# Patient Record
Sex: MALE | Race: WHITE | NOT HISPANIC OR LATINO | Employment: PART TIME | URBAN - METROPOLITAN AREA
[De-identification: names, ages, dates, MRNs, and addresses within clinical notes are randomized per-mention and may not be internally consistent; named-entity substitution may affect disease eponyms.]

---

## 2017-05-24 ENCOUNTER — LAB CONVERSION - ENCOUNTER (OUTPATIENT)
Dept: OTHER | Facility: OTHER | Age: 62
End: 2017-05-24

## 2017-05-24 LAB
A/G RATIO (HISTORICAL): 2 (CALC) (ref 1–2.5)
ALBUMIN SERPL BCP-MCNC: 4.5 G/DL (ref 3.6–5.1)
ALP SERPL-CCNC: 48 U/L (ref 40–115)
ALT SERPL W P-5'-P-CCNC: 25 U/L (ref 9–46)
AST SERPL W P-5'-P-CCNC: 20 U/L (ref 10–35)
BILIRUB SERPL-MCNC: 0.6 MG/DL (ref 0.2–1.2)
BUN SERPL-MCNC: 21 MG/DL (ref 7–25)
BUN/CREA RATIO (HISTORICAL): ABNORMAL (CALC) (ref 6–22)
CALCIUM SERPL-MCNC: 9.5 MG/DL (ref 8.6–10.3)
CHLORIDE SERPL-SCNC: 105 MMOL/L (ref 98–110)
CHOLEST SERPL-MCNC: 163 MG/DL (ref 125–200)
CHOLEST/HDLC SERPL: 3.9 (CALC)
CO2 SERPL-SCNC: 27 MMOL/L (ref 20–31)
CREAT SERPL-MCNC: 0.98 MG/DL (ref 0.7–1.25)
EGFR AFRICAN AMERICAN (HISTORICAL): 96 ML/MIN/1.73M2
EGFR-AMERICAN CALC (HISTORICAL): 83 ML/MIN/1.73M2
GAMMA GLOBULIN (HISTORICAL): 2.3 G/DL (CALC) (ref 1.9–3.7)
GLUCOSE (HISTORICAL): 109 MG/DL (ref 65–99)
HBA1C MFR BLD HPLC: 5.9 % OF TOTAL HGB
HDLC SERPL-MCNC: 42 MG/DL
LDL CHOLESTEROL DIRECT (HISTORICAL): 105 MG/DL
NON-HDL-CHOL (CHOL-HDL) (HISTORICAL): 121 MG/DL (CALC)
POTASSIUM SERPL-SCNC: 4.4 MMOL/L (ref 3.5–5.3)
SODIUM SERPL-SCNC: 141 MMOL/L (ref 135–146)
TOTAL PROTEIN (HISTORICAL): 6.8 G/DL (ref 6.1–8.1)
TRIGL SERPL-MCNC: 113 MG/DL

## 2017-05-30 ENCOUNTER — ALLSCRIPTS OFFICE VISIT (OUTPATIENT)
Dept: OTHER | Facility: OTHER | Age: 62
End: 2017-05-30

## 2017-11-15 ENCOUNTER — LAB CONVERSION - ENCOUNTER (OUTPATIENT)
Dept: OTHER | Facility: OTHER | Age: 62
End: 2017-11-15

## 2017-11-15 LAB
A/G RATIO (HISTORICAL): 1.8 (CALC) (ref 1–2.5)
ALBUMIN SERPL BCP-MCNC: 4.4 G/DL (ref 3.6–5.1)
ALP SERPL-CCNC: 45 U/L (ref 40–115)
ALT SERPL W P-5'-P-CCNC: 28 U/L (ref 9–46)
AST SERPL W P-5'-P-CCNC: 26 U/L (ref 10–35)
BILIRUB SERPL-MCNC: 0.5 MG/DL (ref 0.2–1.2)
BUN SERPL-MCNC: 19 MG/DL (ref 7–25)
BUN/CREA RATIO (HISTORICAL): ABNORMAL (CALC) (ref 6–22)
CALCIUM (ADJUSTED FOR ALBUMIN) (HISTORICAL): 9.7 MG/DL (CALC) (ref 8.6–10.2)
CALCIUM SERPL-MCNC: 9.7 MG/DL (ref 8.6–10.3)
CHLORIDE SERPL-SCNC: 105 MMOL/L (ref 98–110)
CHOLEST SERPL-MCNC: 152 MG/DL
CHOLEST/HDLC SERPL: 3.9 (CALC)
CO2 SERPL-SCNC: 29 MMOL/L (ref 20–31)
CREAT SERPL-MCNC: 1.12 MG/DL (ref 0.7–1.25)
DEPRECATED RDW RBC AUTO: 12.4 % (ref 11–15)
EGFR AFRICAN AMERICAN (HISTORICAL): 81 ML/MIN/1.73M2
EGFR-AMERICAN CALC (HISTORICAL): 70 ML/MIN/1.73M2
EST. AVERAGE GLUCOSE BLD GHB EST-MCNC: 123 (CALC)
EST. AVERAGE GLUCOSE BLD GHB EST-MCNC: 6.8 (CALC)
GAMMA GLOBULIN (HISTORICAL): 2.4 G/DL (CALC) (ref 1.9–3.7)
GLUCOSE (HISTORICAL): 131 MG/DL (ref 65–99)
HBA1C MFR BLD HPLC: 5.9 % OF TOTAL HGB
HCT VFR BLD AUTO: 47 % (ref 38.5–50)
HDLC SERPL-MCNC: 39 MG/DL
HGB BLD-MCNC: 15.6 G/DL (ref 13.2–17.1)
LDL CHOLESTEROL (HISTORICAL): 95 MG/DL (CALC)
MCH RBC QN AUTO: 30.6 PG (ref 27–33)
MCHC RBC AUTO-ENTMCNC: 33.2 G/DL (ref 32–36)
MCV RBC AUTO: 92.2 FL (ref 80–100)
NON-HDL-CHOL (CHOL-HDL) (HISTORICAL): 113 MG/DL (CALC)
PLATELET # BLD AUTO: 152 THOUSAND/UL (ref 140–400)
PMV BLD AUTO: 9.7 FL (ref 7.5–12.5)
POTASSIUM SERPL-SCNC: 4.7 MMOL/L (ref 3.5–5.3)
PROSTATE SPECIFIC ANTIGEN TOTAL (HISTORICAL): 0.5 NG/ML
RBC # BLD AUTO: 5.1 MILLION/UL (ref 4.2–5.8)
SODIUM SERPL-SCNC: 138 MMOL/L (ref 135–146)
TOTAL PROTEIN (HISTORICAL): 6.8 G/DL (ref 6.1–8.1)
TRIGL SERPL-MCNC: 85 MG/DL
TSH SERPL DL<=0.05 MIU/L-ACNC: 1.52 MIU/L (ref 0.4–4.5)
WBC # BLD AUTO: 4.5 THOUSAND/UL (ref 3.8–10.8)

## 2017-11-24 ENCOUNTER — ALLSCRIPTS OFFICE VISIT (OUTPATIENT)
Dept: OTHER | Facility: OTHER | Age: 62
End: 2017-11-24

## 2017-11-24 DIAGNOSIS — R91.8 OTHER NONSPECIFIC ABNORMAL FINDING OF LUNG FIELD (CODE): ICD-10-CM

## 2017-11-25 NOTE — PROGRESS NOTES
Assessment    1  Pulmonary nodules (793 19) (R91 8)   · CT 6/2/16, for a 4 mm lingular nodule in 3 mm left lower lobe nodule, repeat CT one year,   2  Hypertension (401 9) (I10)   3  Hyperlipidemia (272 4) (E78 5)   4  Hyperglycemia (790 29) (R73 9)    Plan  BPH with obstruction/lower urinary tract symptoms, Erectile dysfunction of non-organicorigin    · Cialis 10 MG Oral Tablet; TAKE 1 TABLET DAILY 1 HOUR BEFORE NEEDED  Health Maintenance, Hyperglycemia, Hyperlipidemia, Hypertension    · (1) LDL,DIRECT; Status:Active; Requested for:01May2018;    · (Q) COMPREHENSIVE METABOLIC PNL W/ADJUSTED CALCIUM; Status:Active; Requested for:01May2018;    · (Q) HEMOGLOBIN A1c WITH eAG; Status:Active; Requested for:01May2018; Discussion/Summary    #1  Hypertension-stable at 122/60  He is on amlodipine 10 milligrams daily and losartan 25 milligrams daily  Hyperlipidemia-cholesterol numbers are at goal with the LDL 95  He takes atorvastatin 10 milligrams daily  Hyperglycemia-patient's glucose went up from 109 to 131 but his hemoglobin A1c remained the same at 5 9 percent  Pulmonary nodules-low-dose CT scan of lungs will be obtained  to keep his blood sugar down and his cholesterol numbers down  6 months with labs  Call patient with results of CT scan for pulmonary nodule follow-up  Possible side effects of new medications were reviewed with the patient/guardian today  The treatment plan was reviewed with the patient/guardian  The patient/guardian understands and agrees with the treatment plan     Self Referrals: No      Chief Complaint  6 month follow up for chronic conditions and to review blood work  Jayce Brewster      History of Present Illness  This is a 57-year-old male who comes in for his regular 6 month visit  He is feeling well with no complaints or problems  His blood pressure today is 122/60 but he has gained 6 pound since the previous visit with his weight being 193 pounds   Reviewing his labs his LDL came down from 105 to 95, glucose went up from 109 to 131 but his hemoglobin A1c remained the same at 5 9 percent  His CBC, TSH and PSA are all within normal range  Review of Systems   Constitutional: No fever or chills, feels well, no tiredness, no recent weight gain or weight loss  ENT: no complaints of earache, no hearing loss, no nosebleeds, no nasal discharge, no sore throat, no hoarseness  Cardiovascular: No complaints of slow heart rate, no fast heart rate, no chest pain, no palpitations, no leg claudication, no lower extremity  Respiratory: No complaints of shortness of breath, no wheezing, no cough, no SOB on exertion, no orthopnea or PND  Gastrointestinal: No complaints of abdominal pain, no constipation, no nausea or vomiting, no diarrhea or bloody stools  Genitourinary: No complaints of dysuria, no incontinence, no hesitancy, no nocturia, no genital lesion, no testicular pain  ROS reviewed  Active Problems  1  BPH with obstruction/lower urinary tract symptoms (600 01,599 69) (N40 1,N13 8)   2  Cervicalgia (723 1) (M54 2)   3  Colonoscopy (Fiberoptic) Screening   4  Contact dermatitis (692 9) (L25 9)   5  Encounter for prostate cancer screening (V76 44) (Z12 5)   6  Erectile dysfunction of non-organic origin (302 72) (F52 21)   7  Headache (784 0) (R51)   8  Hyperglycemia (790 29) (R73 9)   9  Hyperlipidemia (272 4) (E78 5)   10  Hypertension (401 9) (I10)   11  Migraine headache (346 90) (G43 909)   12  Need for immunization against influenza (V04 81) (Z23)   13  Nocturia (788 43) (R35 1)   14  Pulmonary nodules (793 19) (R91 8)   15  Screening for colon cancer (V76 51) (Z12 11)   16  Tinnitus (388 30) (H93 19)    Past Medical History  1  History of Alcohol Abuse - In Remission (305 03)   2  History of Drug dependence, in remission (304 93) (F19 21)   3  History of Head Injury (959 01)    The active problems and past medical history were reviewed and updated today  Surgical History  1   History of Complete Colonoscopy   2  History of Elbow Surgery   3  History of Nasal Septal Deviation Repair   4  History of Tonsillectomy    The surgical history was reviewed and updated today  Family History  Mother    1  Family history of Headache   2  Family history of Heart Disease (V17 49)   3  Family history of Hypertension (V17 49)   4  Family history of Pure Hypercholesterolemia  Daughter    5  Family history of Bipolar Disorder NOS   6  Family history of Headache   7  Family history of Irritable Bowel Syndrome    The family history was reviewed and updated today  Social History     · Caffeine Use   ·    · Employed   · Former smoker (T73 73) (T47 805)   · Marital History - Single   · No secondhand smoke exposure (V49 89) (Z78 9)   · Occupation:   · Recovering Alcoholic   · Recovering From Drug Addiction  The social history was reviewed and updated today  The social history was reviewed and is unchanged  Current Meds   1  Allegra Allergy 180 MG Oral Tablet Recorded   2  AmLODIPine Besylate 10 MG Oral Tablet; Take 1 tablet daily; Therapy: 93QNL6236 to (Evaluate:79Oyd2814)  Requested for: 39BFU4597; Last Rx:01Mar2017 Ordered   3  Atorvastatin Calcium 10 MG Oral Tablet; Take 1 tablet daily; Therapy: 04RYI0401 to (Lance Fang)  Requested for: 08JZF6820; Last Rx:14Nov2017 Ordered   4  Cialis 10 MG Oral Tablet; TAKE 1 TABLET DAILY 1 HOUR BEFORE NEEDED; Therapy: 14ECB3162 to (Evaluate:26Nov2017)  Requested for: 49TTE6159; Last Rx:66Bop4592 Ordered   5  Ibuprofen 200 MG Oral Tablet Recorded   6  Losartan Potassium 25 MG Oral Tablet; Take 1 tablet daily; Therapy: 14FUU0405 to (Evaluate:72Qca7294)  Requested for: 17YRQ4113; Last Rx:01Mar2017 Ordered   7  Mometasone Furoate 0 1 % External Cream; APPLY SPARINGLY TO AFFECTED AREAS TWICE DAILY  (AM AND PM); Therapy: 07UIT8698 to (Last KL:98QEP5271)  Requested for: 97TVW2268 Ordered   8   SUMAtriptan Succinate 100 MG Oral Tablet; TAKE AS DIRECTED  Requested for: 94MEM2353; Last Rx:17Mar2016 Ordered   9  Tamsulosin HCl - 0 4 MG Oral Capsule; take 1 capsule daily; Therapy: 71DSM4016 to (Evaluate:27Aoo2844)  Requested for: 42QNL3229; Last Rx:14Nov2017 Ordered   10  Tamsulosin HCl - 0 4 MG Oral Capsule; TAKE 1 CAPSULE EVERY DAY; Therapy: 79BUX7887 to ()  Requested for: 63Dwy2366; Last  Rx:52Rpl5074 Ordered    The medication list was reviewed and updated today  Allergies  1  No Known Drug Allergies    Vitals  Vital Signs    Recorded: 51ZLU1143 11:39AM   Heart Rate 72   Systolic 484, LUE, Sitting   Diastolic 60, LUE, Sitting   Height 5 ft 10 in   Weight 193 lb 8 oz   BMI Calculated 27 76   BSA Calculated 2 06       Physical Exam   Constitutional  General appearance: No acute distress, well appearing and well nourished  Ears, Nose, Mouth, and Throat  External inspection of ears and nose: Normal    Otoscopic examination: Tympanic membrance translucent with normal light reflex  Canals patent without erythema  Oropharynx: Normal with no erythema, edema, exudate or lesions  Pulmonary  Auscultation of lungs: Clear to auscultation, equal breath sounds bilaterally, no wheezes, no rales, no rhonci  Cardiovascular  Auscultation of heart: Normal rate and rhythm, normal S1 and S2, without murmurs  Examination of extremities for edema and/or varicosities: Normal    Psychiatric  Orientation to person, place and time: Normal    Mood and affect: Normal          Results/Data  PHQ-2 Adult Depression Screening 24Nov2017 11:53AM User, Ahs     Test Name Result Flag Reference   PHQ-2 Adult Depression Score 0       Over the last two weeks, how often have you been bothered by any of the following problems?  Little interest or pleasure in doing things: Not at all - 0 Feeling down, depressed, or hopeless: Not at all - 0   PHQ-2 Adult Depression Screening Negative         Future Appointments    Date/Time Provider Specialty Site   05/21/2018 04:30 PM Elizabeth Mason [facility]       Signatures   Electronically signed by : Fadi Putnam, [facility]; Nov 24 2017 12:24PM EST                       (Author)    Electronically signed by : Ale Botello DO; Nov 24 2017 12:26PM EST                       (Author)

## 2018-01-11 NOTE — RESULT NOTES
Verified Results  (1) COMPREHENSIVE METABOLIC PANEL [id number] 95:15CW Jazmyn Mcdaniel     Test Name Result Flag Reference   GLUCOSE 137 mg/dL H 65-99   Fasting reference interval   UREA NITROGEN (BUN) 21 mg/dL  7-25   CREATININE 1 14 mg/dL  0 70-1 25   For patients >52years of age, the reference limit  for Creatinine is approximately 13% higher for people  identified as -American  eGFR NON-AFR  AMERICAN 69 mL/min/1 73m2  > OR = 60   eGFR AFRICAN AMERICAN 80 mL/min/1 73m2  > OR = 60   BUN/CREATININE RATIO   0-31   NOT APPLICABLE (calc)   SODIUM 137 mmol/L  135-146   POTASSIUM 4 4 mmol/L  3 5-5 3   CHLORIDE 101 mmol/L     CARBON DIOXIDE 29 mmol/L  20-31   CALCIUM 9 7 mg/dL  8 6-10 3   PROTEIN, TOTAL 7 2 g/dL  6 1-8 1   ALBUMIN 4 5 g/dL  3 6-5 1   GLOBULIN 2 7 g/dL (calc)  1 9-3 7   ALBUMIN/GLOBULIN RATIO 1 7 (calc)  1 0-2 5   BILIRUBIN, TOTAL 0 6 mg/dL  0 2-1 2   ALKALINE PHOSPHATASE 55 U/L     AST 24 U/L  10-35   ALT 30 U/L  9-46     (1) INSULIN 62FOL4676 08:55AM Pradeep Mcdaniel     Test Name Result Flag Reference   INSULIN 11 1 uIU/mL  2 0-19 6   This insulin assay shows strong cross-reactivity for  some insulin analogs (lispro, aspart, and glargine)  and much lower cross-reactivity with others (detemir,  glulisine)  (1) LIPID PANEL, FASTING [id number] 08:55AM Pradeep Mcdaniel     Test Name Result Flag Reference   CHOLESTEROL, TOTAL 207 mg/dL H 125-200   HDL CHOLESTEROL 38 mg/dL L > OR = 40   TRIGLICERIDES 743 mg/dL H <150   LDL-CHOLESTEROL 131 mg/dL (calc) H <130   Desirable range <100 mg/dL for patients with CHD or  diabetes and <70 mg/dL for diabetic patients with  known heart disease  CHOL/HDLC RATIO 5 4 (calc) H < OR = 5 0   NON HDL CHOLESTEROL 169 mg/dL (calc) H    Target for non-HDL cholesterol is 30 mg/dL higher than   LDL cholesterol target       (Q) CBC (H/H, RBC, INDICES, WBC, PLT) [id number] 08:55AM Pradeep Mcdaniel     Test Name Result Flag Reference   WHITE BLOOD CELL COUNT 6 2 Thousand/uL  3 8-10 8   RED BLOOD CELL COUNT 5 34 Million/uL  4 20-5 80   HEMOGLOBIN 16 1 g/dL  13 2-17 1   HEMATOCRIT 48 7 %  38 5-50 0   MCV 91 0 fL  80 0-100 0   MCH 30 1 pg  27 0-33 0   MCHC 33 1 g/dL  32 0-36 0   RDW 13 1 %  11 0-15 0   PLATELET COUNT 752 Thousand/uL  140-400   MPV 6 9 fL L 7 5-11 5     (Q) TSH, 3RD GENERATION 97GAQ7897 08:55AM Pradeep Mcdaniel     Test Name Result Flag Reference   TSH 1 50 mIU/L  0 40-4 50     (1) PSA (SCREEN) (Dx V76 44 Screen for Prostate Cancer) [id number] 08:55AM Gillian Mcdaniel     Test Name Result Flag Reference   PSA, TOTAL 0 4 ng/mL  < OR = 4 0   This test was performed using the Siemens  chemiluminescent method  Values obtained from  different assay methods cannot be used  interchangeably  PSA levels, regardless of  value, should not be interpreted as absolute  evidence of the presence or absence of disease  (Q) HEMOGLOBIN A1c 28Nov2016 08:55AM Pradeep Mcdaniel     Test Name Result Flag Reference   HEMOGLOBIN A1c 6 3 % of total Hgb H <5 7   According to ADA guidelines, hemoglobin A1c <7 0%  represents optimal control in non-pregnant diabetic  patients  Different metrics may apply to specific  patient populations  Standards of Medical Care in  [phone]  Diabetes Care  2013;36:s11-s66     For the purpose of screening for the presence of  diabetes  <5 7%       Consistent with the absence of diabetes  5 7-6 4%    Consistent with increased risk for diabetes              (prediabetes)  >or=6 5%    Consistent with diabetes     This assay result is consistent with a higher risk  of diabetes  Currently, no consensus exists for use of hemoglobin  A1c for diagnosis of diabetes for children       (Q) URINALYSIS REFLEX [id number] 08:55AM Pradeep Mcdaniel   REPORT COMMENT:  FASTING:YES     Test Name Result Flag Reference   COLOR YELLOW  YELLOW   APPEARANCE CLOUDY A CLEAR   SPECIFIC GRAVITY 1 015  1 001-1 035   PH 7 5  5 0-8 0   GLUCOSE NEGATIVE  NEGATIVE   BILIRUBIN NEGATIVE  NEGATIVE   KETONES NEGATIVE  NEGATIVE   OCCULT BLOOD NEGATIVE  NEGATIVE   PROTEIN NEGATIVE  NEGATIVE   NITRITE NEGATIVE  NEGATIVE   LEUKOCYTE ESTERASE NEGATIVE  NEGATIVE

## 2018-01-11 NOTE — RESULT NOTES
Verified Results  US KIDNEY AND BLADDER [id number] 07:03PM Dairl Left Order Number: [id number]     Test Name Result Flag Reference   US KIDNEY AND BLADDER (Report)     RENAL ULTRASOUND     INDICATION: Enlarged prostate, dysuria and lower abdominal pain  COMPARISON: None     TECHNIQUE:  Ultrasound of the retroperitoneum was performed with a curvilinear transducer utilizing volumetric sweeps and still imaging techniques  FINDINGS:     KIDNEYS:   Symmetric and normal size  Right kidney: 11 3 x 6 5 cm  Normal echogenicity and contour  No suspicious masses detected  No hydronephrosis  No shadowing calculi  No perinephric fluid collections  Left kidney: 11 2 x 5 4 cm  Normal echogenicity and contour  No suspicious masses detected  No hydronephrosis  Mild lower pole caliectasis versus parapelvic cyst    No shadowing calculi  No perinephric fluid collections  URETERS:   Nonvisualized  BLADDER:    Poorly distended  Bladder volume measures 153 mL  No focal thickening or mass lesions  Bilateral ureteral jets detected  Enlarged prostate measuring 5 0 x 2 7 x 4 3 cm with hypertrophy of the median lobe protruding into the base of the bladder  IMPRESSION:     No hydronephrosis  Mild left lower pole caliectasis versus parapelvic cyst       Prostatomegaly  Workstation performed: [id number]     Signed by:   Clovis Peters DO   5/26/16       Plan  Mass of left kidney    · CT ABDOMEN W WO CONTRAST; Status:Need Information - Financial Authorization;   Requested for:93Iud3249;   Nocturia, Pelvic pain, Prostatic enlargement    · *1 - [facility] Physician Referral  Consult  Status: Active  Requested  for: 11TFB1454  Care Summary provided  : Yes

## 2018-01-12 VITALS
BODY MASS INDEX: 27.7 KG/M2 | SYSTOLIC BLOOD PRESSURE: 122 MMHG | HEIGHT: 70 IN | WEIGHT: 193.5 LBS | HEART RATE: 72 BPM | DIASTOLIC BLOOD PRESSURE: 60 MMHG

## 2018-01-14 VITALS
HEART RATE: 82 BPM | HEIGHT: 70 IN | BODY MASS INDEX: 26.77 KG/M2 | DIASTOLIC BLOOD PRESSURE: 72 MMHG | SYSTOLIC BLOOD PRESSURE: 110 MMHG | WEIGHT: 187 LBS

## 2018-01-14 NOTE — RESULT NOTES
Verified Results  (Q) CHLAMYDIA/N  GONORRHOEAE DNA, SDA [id number] 12:00AM Pradeep Mcdaniel     Test Name Result Flag Reference   CHLAMYDIA TRACHOMATIS$RNA, TMA NOT DETECTED  NOT DETECTED   NEISSERIA [address], TMA NOT DETECTED  NOT DETECTED   This test was performed using the [address] [place] [address]  ([place] [address] )  The analytical performance characteristics of this   assay, when used to test SurePath specimens have  been determined by [employer]             (Q) CULTURE, URINE, SPECIAL [id number] 12:00AM Pradeep Mcdaniel     Test Name Result Flag Reference   CULTURE, URINE, SPECIAL      CULTURE, URINE, SPECIAL         MICRO NUMBER:      [id number]    TEST STATUS:       FINAL    SPECIMEN SOURCE:   URINE    SPECIMEN QUALITY:  ADEQUATE    RESULT:            No Growth

## 2018-01-15 NOTE — MISCELLANEOUS
Message  c-t scheduled for june 2,2016 at [facility] for 7:15 arrive at 7:00 4 hour fast  told to make appointment at urology information mailed to patient  Active Problems    1  Cervicalgia (723 1) (M54 2)   2  Colonoscopy (Fiberoptic) Screening   3  Erectile dysfunction of non-organic origin (302 72) (F52 21)   4  Headache (784 0) (R51)   5  Hyperglycemia (790 29) (R73 9)   6  Hyperlipidemia (272 4) (E78 5)   7  Hypertension (401 9) (I10)   8  Mass of left kidney (593 9) (N28 89)   9  Migraine headache (346 90) (G43 909)   10  Need for immunization against influenza (V04 81) (Z23)   11  Nocturia (788 43) (R35 1)   12  Pelvic pain (R10 2)   13  Prostatic enlargement (600 00) (N40 0)   14  Urethritis (597 80) (N34 2)   15  Urgency of urination (788 63) (R39 15)   16  Urinary frequency (788 41) (R35 0)   17  Weak urinary stream (788 62) (R39 12)    Current Meds   1  AmLODIPine Besylate 10 MG Oral Tablet; TAKE 1 TABLET DAILY  Requested for:   19KDA4602; Last Rx:17Mar2016 Ordered   2  Cialis 10 MG Oral Tablet; Take as directed; Therapy: 28ETF1998 to (Evaluate:16Avv1965); Last Rx:67Rvj4993 Ordered   3  Ciprofloxacin HCl - 500 MG Oral Tablet; Take 1 tablet twice daily; Therapy: 09TOD1199 to (Last Rx:26May2016)  Requested for: 36XKD6451 Ordered   4  Cyclobenzaprine HCl - 5 MG Oral Tablet; TAKE 1 -2 AT BEDTIME AS NEEDED FOR   NECK PAIN;   Therapy: 85WYE5684 to (Last Rx:08Kgo8816)  Requested for: 36BER3892 Ordered   5  Divalproex Sodium  MG Oral Tablet Extended Release 24 Hour; One at bedtime   for 5 days then one tab everother day for 6 days then stop; Therapy: 48CGD4436 to (Last Rx:11May2016)  Requested for: 66QDW0809 Ordered   6  Losartan Potassium 25 MG Oral Tablet; Take 1 tablet daily  Requested for: 97GKJ4489;   Last Rx:17Mar2016 Ordered   7  Pravastatin Sodium 20 MG Oral Tablet; TAKE 1 TABLET DAILY  Requested for:   97IOS6297; Last Rx:17Mar2016 Ordered   8   SUMAtriptan Succinate 100 MG Oral Tablet; TAKE AS DIRECTED  Requested for:   11ZFE8618; Last Rx:17Mar2016 Ordered   9  Tamsulosin HCl - 0 4 MG Oral Capsule; take 1 capsule daily; Therapy: 19TDC0608 to (Giuliano Moran)  Requested for: 15RQU4017; Last   Rx:62Vsu7265 Ordered    Allergies    1   No Known Drug Allergies    Signatures   Electronically signed by : Arjun Mar DO; May 27 2016  8:53AM EST                       (Author)

## 2018-03-09 ENCOUNTER — TELEPHONE (OUTPATIENT)
Dept: FAMILY MEDICINE CLINIC | Facility: CLINIC | Age: 63
End: 2018-03-09

## 2018-03-09 ENCOUNTER — OFFICE VISIT (OUTPATIENT)
Dept: FAMILY MEDICINE CLINIC | Facility: CLINIC | Age: 63
End: 2018-03-09
Payer: COMMERCIAL

## 2018-03-09 VITALS
HEART RATE: 84 BPM | WEIGHT: 193.6 LBS | DIASTOLIC BLOOD PRESSURE: 70 MMHG | BODY MASS INDEX: 27.78 KG/M2 | SYSTOLIC BLOOD PRESSURE: 130 MMHG

## 2018-03-09 DIAGNOSIS — I10 ESSENTIAL HYPERTENSION: ICD-10-CM

## 2018-03-09 DIAGNOSIS — R06.83 SNORING: Primary | ICD-10-CM

## 2018-03-09 DIAGNOSIS — E78.2 MIXED HYPERLIPIDEMIA: ICD-10-CM

## 2018-03-09 PROCEDURE — 99214 OFFICE O/P EST MOD 30 MIN: CPT | Performed by: PHYSICIAN ASSISTANT

## 2018-03-09 RX ORDER — LOSARTAN POTASSIUM 25 MG/1
25 TABLET ORAL DAILY
Qty: 90 TABLET | Refills: 3 | Status: SHIPPED | OUTPATIENT
Start: 2018-03-09 | End: 2019-01-21 | Stop reason: SDUPTHER

## 2018-03-09 RX ORDER — AMLODIPINE BESYLATE 10 MG/1
1 TABLET ORAL DAILY
COMMUNITY
Start: 2017-03-01 | End: 2018-03-09 | Stop reason: SDUPTHER

## 2018-03-09 RX ORDER — TAMSULOSIN HYDROCHLORIDE 0.4 MG/1
0.4 CAPSULE ORAL DAILY
Qty: 90 CAPSULE | Refills: 3 | Status: SHIPPED | OUTPATIENT
Start: 2018-03-09 | End: 2019-01-21 | Stop reason: SDUPTHER

## 2018-03-09 RX ORDER — LOSARTAN POTASSIUM 25 MG/1
1 TABLET ORAL DAILY
COMMUNITY
Start: 2017-03-01 | End: 2018-03-09 | Stop reason: SDUPTHER

## 2018-03-09 RX ORDER — SUMATRIPTAN 100 MG/1
TABLET, FILM COATED ORAL
COMMUNITY

## 2018-03-09 RX ORDER — ATORVASTATIN CALCIUM 10 MG/1
1 TABLET, FILM COATED ORAL DAILY
COMMUNITY
Start: 2016-11-30 | End: 2018-03-09 | Stop reason: SDUPTHER

## 2018-03-09 RX ORDER — ATORVASTATIN CALCIUM 10 MG/1
10 TABLET, FILM COATED ORAL DAILY
Qty: 90 TABLET | Refills: 3 | Status: SHIPPED | OUTPATIENT
Start: 2018-03-09 | End: 2018-06-27 | Stop reason: SDUPTHER

## 2018-03-09 RX ORDER — TAMSULOSIN HYDROCHLORIDE 0.4 MG/1
1 CAPSULE ORAL DAILY
COMMUNITY
Start: 2016-05-26 | End: 2018-03-09 | Stop reason: SDUPTHER

## 2018-03-09 RX ORDER — AMLODIPINE BESYLATE 10 MG/1
10 TABLET ORAL DAILY
Qty: 90 TABLET | Refills: 3 | Status: SHIPPED | OUTPATIENT
Start: 2018-03-09 | End: 2019-01-21 | Stop reason: SDUPTHER

## 2018-03-09 NOTE — PATIENT INSTRUCTIONS
1   Snoring-patient has had chronic problems with snoring for several years  Question possible sleep apnea  Would recommend home sleep study  Patient will also be set up with ENT specialist for evaluation  He has had previous sinus surgery and we want to rule out any complication from this as well as possibly discuss surgical treatments for sleep apnea if necessary  2   Hypertension-stable on present regimen, no medication changes  3   Hyperlipidemia-stable on statin therapy, no medication changes

## 2018-03-09 NOTE — TELEPHONE ENCOUNTER
SUMIT HARVEY [facility] CALLED AND ADVISED THAT THEY ARE FAXING OVER A PRIOR AUTH FOR PT'S MEDICATIONS  CALLED [facility]EN[facility]AL FAX AND LMOM THAT WE WILL NEED THAT PAPERWORK FORWARDED FOR APPT TODAY

## 2018-03-09 NOTE — PROGRESS NOTES
Assessment/Plan:  Patient Instructions   1  Snoring-patient has had chronic problems with snoring for several years  Question possible sleep apnea  Would recommend home sleep study  Patient will also be set up with ENT specialist for evaluation  He has had previous sinus surgery and we want to rule out any complication from this as well as possibly discuss surgical treatments for sleep apnea if necessary  2   Hypertension-stable on present regimen, no medication changes  3   Hyperlipidemia-stable on statin therapy, no medication changes  No problem-specific Assessment & Plan notes found for this encounter  Diagnoses and all orders for this visit:    Snoring  -     amLODIPine (NORVASC) 10 mg tablet; Take 1 tablet (10 mg total) by mouth daily  -     atorvastatin (LIPITOR) 10 mg tablet; Take 1 tablet (10 mg total) by mouth daily  -     losartan (COZAAR) 25 mg tablet; Take 1 tablet (25 mg total) by mouth daily  -     tamsulosin (FLOMAX) 0 4 mg; Take 1 capsule (0 4 mg total) by mouth daily  -     Home Study; Future  -     Ambulatory Referral to Otolaryngology; Future    Mixed hyperlipidemia  -     amLODIPine (NORVASC) 10 mg tablet; Take 1 tablet (10 mg total) by mouth daily  -     atorvastatin (LIPITOR) 10 mg tablet; Take 1 tablet (10 mg total) by mouth daily  -     losartan (COZAAR) 25 mg tablet; Take 1 tablet (25 mg total) by mouth daily  -     tamsulosin (FLOMAX) 0 4 mg; Take 1 capsule (0 4 mg total) by mouth daily  -     Home Study; Future  -     Ambulatory Referral to Otolaryngology; Future    Essential hypertension  -     amLODIPine (NORVASC) 10 mg tablet; Take 1 tablet (10 mg total) by mouth daily  -     atorvastatin (LIPITOR) 10 mg tablet; Take 1 tablet (10 mg total) by mouth daily  -     losartan (COZAAR) 25 mg tablet; Take 1 tablet (25 mg total) by mouth daily  -     tamsulosin (FLOMAX) 0 4 mg; Take 1 capsule (0 4 mg total) by mouth daily  -     Home Study;  Future  -     Ambulatory Referral to Otolaryngology; Future          Subjective:    CC: Pt  Would like to discuss snoring  shannon     Patient ID: Jeri Hawk is a 58 y o  male  HPI:  This is a 63-year-old gentleman that presents to the office for follow-up of chronic health conditions and concerns over snoring  He states that he has been snoring for several years and in fact about 2 decades ago had sinus surgery for deviated septum but never seemed to improve with his snoring afterward  He wakes up about 4-5 times per night to urinate for his prostate but aside from that he seems to wake well rested and feel well throughout the day  He has had some witnessed apneic episodes at night time however  His snoring will often wake himself from sleep  He also has a history of hypertension and hyperlipidemia  The following portions of the patient's history were reviewed and updated as appropriate: allergies, current medications, past family history, past medical history, past social history, past surgical history and problem list     Review of Systems   Constitutional: Negative for chills, fatigue and fever  HENT: Negative for congestion, ear pain and sinus pressure  Eyes: Negative for visual disturbance  Respiratory: Negative for cough, chest tightness and shortness of breath  Cardiovascular: Negative for chest pain and palpitations  Gastrointestinal: Negative for diarrhea, nausea and vomiting  Endocrine: Negative for polyuria  Genitourinary: Negative for dysuria and frequency  Musculoskeletal: Negative for arthralgias and myalgias  Skin: Negative for pallor and rash  Neurological: Negative for dizziness, weakness, light-headedness, numbness and headaches  Psychiatric/Behavioral: Negative for agitation, behavioral problems and sleep disturbance  All other systems reviewed and are negative          Objective:      Vitals:    03/09/18 1328   BP: 130/70   BP Location: Left arm   Patient Position: Sitting Pulse: 84   Weight: 87 8 kg (193 lb 9 6 oz)            Physical Exam   Constitutional: He is oriented to person, place, and time  He appears well-developed and well-nourished  No distress  HENT:   Head: Normocephalic and atraumatic  Right Ear: External ear normal    Left Ear: External ear normal    Nose: Nose normal    Mouth/Throat: Oropharynx is clear and moist  No oropharyngeal exudate  Eyes: Conjunctivae and EOM are normal  Pupils are equal, round, and reactive to light  Neck: Normal range of motion  Neck supple  No tracheal deviation present  No thyromegaly present  Cardiovascular: Normal rate, regular rhythm and normal heart sounds  Exam reveals no friction rub  No murmur heard  Pulmonary/Chest: Effort normal and breath sounds normal  No respiratory distress  He has no wheezes  He has no rales  Abdominal: Soft  Bowel sounds are normal  He exhibits no distension  There is no tenderness  There is no rebound and no guarding  Musculoskeletal: Normal range of motion  He exhibits no edema or tenderness  Lymphadenopathy:     He has no cervical adenopathy  Neurological: He is alert and oriented to person, place, and time  No cranial nerve deficit  Coordination normal    Skin: Skin is warm and dry  No rash noted  No erythema  Psychiatric: He has a normal mood and affect  His behavior is normal  Thought content normal    Nursing note and vitals reviewed

## 2018-05-19 ENCOUNTER — TELEPHONE (OUTPATIENT)
Dept: FAMILY MEDICINE CLINIC | Facility: CLINIC | Age: 63
End: 2018-05-19

## 2018-05-19 NOTE — TELEPHONE ENCOUNTER
Pt called and cancelled his 6 month f/up appt for 5/21/18  Pt stated he would like scripts for BW so he can get that done and then schedule his appt  Pt requested that BW scripts be mailed to him as he uses [facility] for BW    Thank you

## 2018-05-21 DIAGNOSIS — Z12.11 SCREEN FOR COLON CANCER: ICD-10-CM

## 2018-05-21 DIAGNOSIS — I10 ESSENTIAL HYPERTENSION: Primary | ICD-10-CM

## 2018-05-21 DIAGNOSIS — E78.2 MIXED HYPERLIPIDEMIA: ICD-10-CM

## 2018-05-21 DIAGNOSIS — N40.1 BPH WITH OBSTRUCTION/LOWER URINARY TRACT SYMPTOMS: ICD-10-CM

## 2018-05-21 DIAGNOSIS — R73.9 HYPERGLYCEMIA: ICD-10-CM

## 2018-05-21 DIAGNOSIS — N13.8 BPH WITH OBSTRUCTION/LOWER URINARY TRACT SYMPTOMS: ICD-10-CM

## 2018-05-21 DIAGNOSIS — Z12.5 SCREENING FOR PROSTATE CANCER: ICD-10-CM

## 2018-05-21 NOTE — PROGRESS NOTES
Orders are done and patient is requesting that these orders be mailed to him  They are printed out front

## 2018-06-04 LAB
ALBUMIN SERPL-MCNC: 4.4 G/DL (ref 3.6–5.1)
ALBUMIN/GLOB SERPL: 1.6 (CALC) (ref 1–2.5)
ALP SERPL-CCNC: 44 U/L (ref 40–115)
ALT SERPL-CCNC: 28 U/L (ref 9–46)
AST SERPL-CCNC: 22 U/L (ref 10–35)
BASOPHILS # BLD AUTO: 30 CELLS/UL (ref 0–200)
BASOPHILS NFR BLD AUTO: 0.6 %
BILIRUB SERPL-MCNC: 0.8 MG/DL (ref 0.2–1.2)
BUN SERPL-MCNC: 22 MG/DL (ref 7–25)
BUN/CREAT SERPL: ABNORMAL (CALC) (ref 6–22)
CALCIUM SERPL-MCNC: 9.2 MG/DL (ref 8.6–10.3)
CHLORIDE SERPL-SCNC: 104 MMOL/L (ref 98–110)
CHOLEST SERPL-MCNC: 168 MG/DL
CHOLEST/HDLC SERPL: 4.5 (CALC)
CO2 SERPL-SCNC: 25 MMOL/L (ref 20–31)
CREAT SERPL-MCNC: 1.18 MG/DL (ref 0.7–1.25)
EOSINOPHIL # BLD AUTO: 80 CELLS/UL (ref 15–500)
EOSINOPHIL NFR BLD AUTO: 1.6 %
ERYTHROCYTE [DISTWIDTH] IN BLOOD BY AUTOMATED COUNT: 12.4 % (ref 11–15)
EST. AVERAGE GLUCOSE BLD GHB EST-MCNC: 120 (CALC)
EST. AVERAGE GLUCOSE BLD GHB EST-SCNC: 6.6 (CALC)
GLOBULIN SER CALC-MCNC: 2.7 G/DL (CALC) (ref 1.9–3.7)
GLUCOSE SERPL-MCNC: 121 MG/DL (ref 65–99)
HBA1C MFR BLD: 5.8 % OF TOTAL HGB
HCT VFR BLD AUTO: 46.3 % (ref 38.5–50)
HDLC SERPL-MCNC: 37 MG/DL
HGB BLD-MCNC: 16.2 G/DL (ref 13.2–17.1)
LDLC SERPL CALC-MCNC: 105 MG/DL (CALC)
LYMPHOCYTES # BLD AUTO: 1665 CELLS/UL (ref 850–3900)
LYMPHOCYTES NFR BLD AUTO: 33.3 %
MCH RBC QN AUTO: 31.9 PG (ref 27–33)
MCHC RBC AUTO-ENTMCNC: 35 G/DL (ref 32–36)
MCV RBC AUTO: 91.1 FL (ref 80–100)
MONOCYTES # BLD AUTO: 470 CELLS/UL (ref 200–950)
MONOCYTES NFR BLD AUTO: 9.4 %
NEUTROPHILS # BLD AUTO: 2755 CELLS/UL (ref 1500–7800)
NEUTROPHILS NFR BLD AUTO: 55.1 %
NONHDLC SERPL-MCNC: 131 MG/DL (CALC)
PLATELET # BLD AUTO: 153 THOUSAND/UL (ref 140–400)
PMV BLD REES-ECKER: 9.7 FL (ref 7.5–12.5)
POTASSIUM SERPL-SCNC: 4.2 MMOL/L (ref 3.5–5.3)
PROT SERPL-MCNC: 7.1 G/DL (ref 6.1–8.1)
PSA FREE MFR SERPL: 33 % (CALC)
PSA FREE SERPL-MCNC: 0.2 NG/ML
PSA SERPL-MCNC: 0.6 NG/ML
RBC # BLD AUTO: 5.08 MILLION/UL (ref 4.2–5.8)
SL AMB EGFR AFRICAN AMERICAN: 76 ML/MIN/1.73M2
SL AMB EGFR NON AFRICAN AMERICAN: 66 ML/MIN/1.73M2
SODIUM SERPL-SCNC: 138 MMOL/L (ref 135–146)
TRIGL SERPL-MCNC: 147 MG/DL
TSH SERPL-ACNC: 1.25 MIU/L (ref 0.4–4.5)
WBC # BLD AUTO: 5 THOUSAND/UL (ref 3.8–10.8)

## 2018-06-27 ENCOUNTER — OFFICE VISIT (OUTPATIENT)
Dept: FAMILY MEDICINE CLINIC | Facility: CLINIC | Age: 63
End: 2018-06-27
Payer: COMMERCIAL

## 2018-06-27 VITALS
SYSTOLIC BLOOD PRESSURE: 106 MMHG | BODY MASS INDEX: 28 KG/M2 | HEIGHT: 70 IN | DIASTOLIC BLOOD PRESSURE: 70 MMHG | WEIGHT: 195.6 LBS | HEART RATE: 76 BPM

## 2018-06-27 DIAGNOSIS — N52.9 ERECTILE DYSFUNCTION, UNSPECIFIED ERECTILE DYSFUNCTION TYPE: ICD-10-CM

## 2018-06-27 DIAGNOSIS — E78.2 MIXED HYPERLIPIDEMIA: ICD-10-CM

## 2018-06-27 DIAGNOSIS — M19.071 PRIMARY OSTEOARTHRITIS OF RIGHT FOOT: ICD-10-CM

## 2018-06-27 DIAGNOSIS — R73.9 HYPERGLYCEMIA: ICD-10-CM

## 2018-06-27 DIAGNOSIS — I10 ESSENTIAL HYPERTENSION: Primary | ICD-10-CM

## 2018-06-27 DIAGNOSIS — R91.8 PULMONARY NODULES: ICD-10-CM

## 2018-06-27 PROCEDURE — 3008F BODY MASS INDEX DOCD: CPT | Performed by: FAMILY MEDICINE

## 2018-06-27 PROCEDURE — 3078F DIAST BP <80 MM HG: CPT | Performed by: FAMILY MEDICINE

## 2018-06-27 PROCEDURE — 3074F SYST BP LT 130 MM HG: CPT | Performed by: FAMILY MEDICINE

## 2018-06-27 PROCEDURE — 99214 OFFICE O/P EST MOD 30 MIN: CPT | Performed by: FAMILY MEDICINE

## 2018-06-27 RX ORDER — TADALAFIL 10 MG/1
10 TABLET ORAL DAILY PRN
COMMUNITY
End: 2018-06-27 | Stop reason: SDUPTHER

## 2018-06-27 RX ORDER — TADALAFIL 10 MG/1
10 TABLET ORAL DAILY PRN
Qty: 90 TABLET | Refills: 1 | Status: SHIPPED | OUTPATIENT
Start: 2018-06-27 | End: 2018-12-10 | Stop reason: SDUPTHER

## 2018-06-27 RX ORDER — ATORVASTATIN CALCIUM 20 MG/1
20 TABLET, FILM COATED ORAL DAILY
Qty: 90 TABLET | Refills: 3 | Status: SHIPPED | OUTPATIENT
Start: 2018-06-27 | End: 2019-05-12 | Stop reason: SDUPTHER

## 2018-06-27 NOTE — ASSESSMENT & PLAN NOTE
He was given Voltaren gel to apply to the base of his right great toe and the bases of his 2nd and 3rd toes 4 times a day until the pain resolves

## 2018-06-27 NOTE — ASSESSMENT & PLAN NOTE
He was given a requisition to get a low-dose CT scan to assess his lung nodules in November of 2017 however he did not get the test done and is requesting to get another requisition so that he can have this test done  He will check with his insurance company to make sure it is covered

## 2018-06-27 NOTE — PROGRESS NOTES
Assessment/Plan:    Hyperglycemia  His hemoglobin A1c came down from 5 9% to 5 8%  Hyperlipidemia  His cholesterol numbers went up slightly but the total cholesterol and triglycerides are within normal range, his HDL dropped from 39-37 and his LDL went up from 95 to 105  His atorvastatin will be increased to 20 mg daily  Hypertension  His blood pressure is stable at 1 0 6/70 and he takes amlodipine 10 mg daily and losartan 25 mg daily  Primary osteoarthritis of right foot  He was given Voltaren gel to apply to the base of his right great toe and the bases of his 2nd and 3rd toes 4 times a day until the pain resolves  Pulmonary nodules  He was given a requisition to get a low-dose CT scan to assess his lung nodules in November of 2017 however he did not get the test done and is requesting to get another requisition so that he can have this test done  He will check with his insurance company to make sure it is covered  Diagnoses and all orders for this visit:    Essential hypertension  -     atorvastatin (LIPITOR) 20 mg tablet; Take 1 tablet (20 mg total) by mouth daily  -     Comprehensive metabolic panel; Future  -     Hemoglobin A1C; Future  -     Lipid Panel with Direct LDL reflex; Future    Mixed hyperlipidemia  -     atorvastatin (LIPITOR) 20 mg tablet; Take 1 tablet (20 mg total) by mouth daily  -     Comprehensive metabolic panel; Future  -     Hemoglobin A1C; Future  -     Lipid Panel with Direct LDL reflex; Future    Primary osteoarthritis of right foot  -     diclofenac sodium (VOLTAREN) 1 %; Apply 2 g topically 4 (four) times a day  -     Comprehensive metabolic panel; Future  -     Hemoglobin A1C; Future  -     Lipid Panel with Direct LDL reflex; Future    Hyperglycemia  -     Hemoglobin A1C; Future    Pulmonary nodules    Erectile dysfunction, unspecified erectile dysfunction type  -     tadalafil (CIALIS) 10 MG tablet;  Take 1 tablet (10 mg total) by mouth daily as needed for erectile dysfunction    Other orders  -     Discontinue: tadalafil (CIALIS) 10 MG tablet; Take 10 mg by mouth daily as needed for erectile dysfunction          Subjective: Follow up and review labs  kw     Patient ID: Dolores Worthy is a 58 y o  male  This is a 45-year-old male who comes in for his regular 6 month visit  He has not been drinking alcohol at all and is doing very well at the present time  His blood pressure is 106/70 and his weight is up 2 lb from the previous visit to 195 lb  Reviewing his labs his hemoglobin A1c came down from 5 9 to 5 8%, glucose came down from 131 to 121, CBC and TSH are both within normal range  His PSA is 0 6, cholesterol numbers are stable but the LDL came up from 95 to 105 and his HDL dropped from 39-37  He will increase his atorvastatin to 20 mg daily  His only complaint is his right big toe is painful and radiates to the bases of his other toes  He will be given Voltaren gel to apply 4 times a day to get rid of that pain  The following portions of the patient's history were reviewed and updated as appropriate: allergies, current medications, past family history, past medical history, past social history, past surgical history and problem list     Review of Systems   Constitutional: Negative  HENT: Negative  Eyes: Negative  Respiratory: Negative  Cardiovascular: Negative  Gastrointestinal: Negative  Endocrine: Negative  Genitourinary: Negative  Musculoskeletal: Positive for arthralgias  Negative for gait problem and joint swelling  Skin: Negative  Allergic/Immunologic: Negative  Neurological: Negative  Hematological: Negative  Psychiatric/Behavioral: Negative  Objective:      /70 (BP Location: Left arm)   Pulse 76   Ht 5' 10" (1 778 m)   Wt 88 7 kg (195 lb 9 6 oz)   BMI 28 07 kg/m²          Physical Exam   Constitutional: He is oriented to person, place, and time   He appears well-developed and well-nourished  HENT:   Head: Normocephalic  Right Ear: External ear normal    Left Ear: External ear normal    Mouth/Throat: Oropharynx is clear and moist    Eyes: Conjunctivae and EOM are normal  Pupils are equal, round, and reactive to light  Neck: Normal range of motion  Neck supple  Cardiovascular: Normal rate, regular rhythm and normal heart sounds  Pulmonary/Chest: Effort normal and breath sounds normal    Abdominal: Soft  Bowel sounds are normal    Musculoskeletal: Normal range of motion  He exhibits tenderness  He exhibits no edema or deformity  Tender to palpation base of right great toe and base of his 2nd toe and 3rd toe on the right foot  Good range of motion without pain  No erythema or injection  There is also no swelling  Neurological: He is alert and oriented to person, place, and time  Skin: Skin is warm  Psychiatric: He has a normal mood and affect   His behavior is normal  Judgment and thought content normal

## 2018-06-27 NOTE — ASSESSMENT & PLAN NOTE
His cholesterol numbers went up slightly but the total cholesterol and triglycerides are within normal range, his HDL dropped from 39-37 and his LDL went up from 95 to 105  His atorvastatin will be increased to 20 mg daily

## 2018-06-27 NOTE — ASSESSMENT & PLAN NOTE
His blood pressure is stable at 1 0 6/70 and he takes amlodipine 10 mg daily and losartan 25 mg daily

## 2018-07-02 LAB — HEMOCCULT STL QL IA: NOT DETECTED

## 2018-07-05 ENCOUNTER — HOSPITAL ENCOUNTER (OUTPATIENT)
Dept: CT IMAGING | Facility: HOSPITAL | Age: 63
Discharge: HOME | End: 2018-07-05
Payer: COMMERCIAL

## 2018-07-05 DIAGNOSIS — R91.8 OTHER NONSPECIFIC ABNORMAL FINDING OF LUNG FIELD (CODE): ICD-10-CM

## 2018-07-09 ENCOUNTER — TRANSCRIBE ORDERS (OUTPATIENT)
Dept: ADMINISTRATIVE | Facility: HOSPITAL | Age: 63
End: 2018-07-09

## 2018-07-09 DIAGNOSIS — R91.8 LUNG NODULES: Primary | ICD-10-CM

## 2018-08-23 ENCOUNTER — TELEPHONE (OUTPATIENT)
Dept: FAMILY MEDICINE CLINIC | Facility: CLINIC | Age: 63
End: 2018-08-23

## 2018-08-23 NOTE — TELEPHONE ENCOUNTER
BT - please address  Pt is aware you will not be able to review until Monday  He states we may leave message on his answering as he returns to work on Monday

## 2018-08-23 NOTE — TELEPHONE ENCOUNTER
Patient had to see Pulmonary and he was scheduled out 2 months  They called to confirm and he was scheduled for a sleep study instead of the doctor  Now he is scheduled out 2 more months to see a pulmonary doctor for his nodules  Can he wait that long or should he find someone else to go to  Please call him at [phone]

## 2018-10-24 ENCOUNTER — OFFICE VISIT (OUTPATIENT)
Dept: SLEEP CENTER | Facility: CLINIC | Age: 63
End: 2018-10-24
Payer: COMMERCIAL

## 2018-10-24 VITALS
SYSTOLIC BLOOD PRESSURE: 114 MMHG | WEIGHT: 196 LBS | BODY MASS INDEX: 28.06 KG/M2 | HEIGHT: 70 IN | DIASTOLIC BLOOD PRESSURE: 78 MMHG | HEART RATE: 80 BPM

## 2018-10-24 DIAGNOSIS — R91.8 LUNG NODULES: ICD-10-CM

## 2018-10-24 DIAGNOSIS — Z87.891 FORMER SMOKER: ICD-10-CM

## 2018-10-24 DIAGNOSIS — R91.8 PULMONARY NODULES: Primary | ICD-10-CM

## 2018-10-24 DIAGNOSIS — R06.83 SNORING: ICD-10-CM

## 2018-10-24 PROCEDURE — 99245 OFF/OP CONSLTJ NEW/EST HI 55: CPT | Performed by: INTERNAL MEDICINE

## 2018-10-24 NOTE — LETTER
October 25, 2018     [id number]  [place] [address] [employer]    Patient: Radha Saucedo   YOB: 1955   Date of Visit: 10/24/2018       Dear Dr Lu Atkinson:    Thank you for referring Carlos Bruce to me for evaluation  Below are my notes for this consultation  If you have questions, please do not hesitate to call me  I look forward to following your patient along with you  Sincerely,        Jhonatan Carter DO        CC: No Recipients  Jhonatan Carter DO  10/25/2018 11:28 PM  Sign at close encounter  Pulmonary Consultation   Radha Saucedo 58 y o  male MRN: [id number]      Reason for consultation: Pulmonary nodules    Requesting physician: Lupe Cee    Assessment/Plan  59 y/o M with PMHx of HTN, HLD, hyperglycemia, previous alcohol abuse and former smoker who comes in for evaluation of pulmonary nodules  1   2 subcentimeter pulmonary nodules in lingula and L lung base -  He does have a significant smoking history and exposures to welding fumes and asbestos  However, these nodules were stable over 2 years      -  Would just continue screening CT yearly due to his smoking history    2  Former smoker -  I did recommend a PFT but due to cost issues, he would like to hold on that for now  He is asymptomatic and does not require therapy at this time  3   Snoring/daytime sleepiness - , Body mass index is 28 12 kg/m² , Neck Circumference: 42         -  He would benefit from a sleeps study but at this time, due to cost issues he would like to hold off  Can follow in pulmonary clinic in 1 year to follow repeat CT to screen for lung cancer  History of Present Illness   HPI:  Radha Saucedo is a 58 y o  male with PMHx as below who comes in for evaluation of pulmonary nodules on CT  He denies any dyspnea, chest tightness, cough, wheezing, night sweats, hemoptysis, weight changes of fatigue    He states that he is asymptomatic and that the nodules were found incidentally years ago as he went for CT abdomen for belly pain  Repeat CT chest shows stability of those nodules  He does admit to some snoring and daytime sleepiness  He denies witnessed apneas, morning headaches or dry mouth upon awakenings  ROS:   Review of Systems  Genitourinary need to urinate more than twice a night   Cardiology none   Gastrointestinal frequent heartburn/acid reflux   Neurology none   Constitutional none   Integumentary none   Psychiatry none   Musculoskeletal back pain   Pulmonary snoring   ENT ringing in ears   Endocrine none   Hematological blood donor        Historical Information   Past Medical History:   Diagnosis Date    Alcohol abuse, in remission     Drug dependence, in remission ([facility] [address] )     Head injury     CLOSED HEAD INJURY WITH CONCUSSION     Past Surgical History:   Procedure Laterality Date    COLONOSCOPY      COMPLETE LAST ASSESSED: [id number]    ELBOW SURGERY      LAST ASSESSED: [id number]    NASAL SEPTUM SURGERY      DEVIATION REPAIR LAST ASSESSED: [id number]    TONSILLECTOMY      LAST ASSESSED: [id number]     Family History   Problem Relation Age of Onset    Other Mother         HEADACHE    Heart disease Mother     Hypertension Mother     Hyperlipidemia Mother         PURE    Bipolar disorder Daughter         NOS    Other Daughter         HEADACHE    Irritable bowel syndrome Daughter      Social History     Social History    Marital status: Single     Spouse name: N/A    Number of children: N/A    Years of education: N/A     Occupational History          EMPLOYED     Social History Main Topics    Smoking status: Former Smoker     Types: Cigars    Smokeless tobacco: Former User      Comment: NO SECONHAND SMOKE EXPOSURE    Alcohol use No      Comment: RECOVERING ALCOHOLIC SINCE 5/6076  DAILY ALCOHOL CONSUMPTIO WAS 30 BEERS PER DAY      Drug use: No      Comment: FORMERLY ADDICTED TO COCAINE, CRACK COCAINE, FORMERLY USED MARIJUANA REGULALRLY, AND USED LSD    Sexual activity: Not on file     Other Topics Concern    Not on file     Social History Narrative    CAFFEINE USE - CONSUMES ON AVERAGE 3 CUPS OF COFFEE PER DAY     AS PER [employer]       Occupational History: welding, previously in a foundry    Meds/Allergies   No Known Allergies    Home medications:  Prior to Admission medications    Medication Sig Start Date End Date Taking? Authorizing Provider   amLODIPine (NORVASC) 10 mg tablet Take 1 tablet (10 mg total) by mouth daily 3/9/18  Yes Vanessa Palmer PA-C   atorvastatin (LIPITOR) 20 mg tablet Take 1 tablet (20 mg total) by mouth daily 6/27/18  Yes Maurice Calles PA-C   diclofenac sodium (VOLTAREN) 1 % Apply 2 g topically 4 (four) times a day 6/27/18  Yes Maurice Calles PA-C   losartan (COZAAR) 25 mg tablet Take 1 tablet (25 mg total) by mouth daily 3/9/18  Yes Vanessa Palmer PA-C   SUMAtriptan (IMITREX) 100 mg tablet Take by mouth   Yes Historical Provider, MD   tadalafil (CIALIS) 10 MG tablet Take 1 tablet (10 mg total) by mouth daily as needed for erectile dysfunction 6/27/18  Yes Maurice Calles PA-C   tamsulosin [facility]) 0 4 mg Take 1 capsule (0 4 mg total) by mouth daily 3/9/18  Yes Vanessa Palmer PA-C       Vitals:   Blood pressure 114/78, pulse 80, height 5' 10" (1 778 m), weight 88 9 kg (196 lb)  , RA, Body mass index is 28 12 kg/m²  Neck Circumference: 42    Physical Exam  General: Pleasant, disheveled, anxious apperaing, Awake alert and oriented x 3, conversant without conversational dyspnea, NAD, normal affect  HEENT:  PERRL, Sclera noninjected, nonicteric OU, Nares patent,  no craniofacial abnormalities, Mucous membranes, moist, no oral lesions, normal dentition    Mallampati class 4  NECK: Trachea midline, no accessory muscle use, no stridor, no cervical or supraclavicular adenopathy, JVP not elevated  CARDIAC: Reg, single s1/S2, no m/r/g  PULM: Decreased breath sounds but no wheezing, rhonchi or rales  ABD: Normoactive bowel sounds, soft nontender, nondistended, no rebound, no rigidity, no guarding  EXT: No cyanosis, no clubbing, no edema, normal capillary refill  NEURO: no focal neurologic deficits, AAOx3, moving all extremities appropriately    Labs: I have personally reviewed pertinent lab results  Lab Results   Component Value Date    WBC 5 0 06/02/2018    HGB 16 2 06/02/2018    HCT 46 3 06/02/2018    MCV 91 1 06/02/2018     06/02/2018      Lab Results   Component Value Date    CALCIUM 9 2 06/02/2018     11/14/2017    K 4 2 06/02/2018    CO2 25 06/02/2018     06/02/2018    BUN 22 06/02/2018    CREATININE 1 12 11/14/2017       PFTs:  The most recent pulmonary function tests were reviewed  None performed    Imaging  I personally reviewed the images on the [facility] system pertinent to today's visit  7/5/18  IMPRESSION:  5 mm lingular nodule, and 3 mm left basilar nodule      Lung-RADS:  Lung-RADS2, benign appearance or behavior  Continue annual screening with LDCT in 12 months  CT abdomen/pelvis 6/2/16   LUNG BASES:  Lingular nodule measures 4 mm  Left lower lobe subpleural nodule measures 3 mm      DO Nora Vas[address] Sleep Physician

## 2018-10-24 NOTE — PROGRESS NOTES
Review of Systems      Genitourinary need to urinate more than twice a night   Cardiology none   Gastrointestinal frequent heartburn/acid reflux   Neurology none   Constitutional none   Integumentary none   Psychiatry none   Musculoskeletal back pain   Pulmonary snoring   ENT ringing in ears   Endocrine none   Hematological blood donor

## 2018-10-26 NOTE — PROGRESS NOTES
Pulmonary Consultation   Jeri Hawk 58 y o  male MRN: [id number]      Reason for consultation: Pulmonary nodules    Requesting physician: Jeyson Doshi    Assessment/Plan  57 y/o M with PMHx of HTN, HLD, hyperglycemia, previous alcohol abuse and former smoker who comes in for evaluation of pulmonary nodules  1   2 subcentimeter pulmonary nodules in lingula and L lung base -  He does have a significant smoking history and exposures to welding fumes and asbestos  However, these nodules were stable over 2 years      -  Would just continue screening CT yearly due to his smoking history    2  Former smoker -  I did recommend a PFT but due to cost issues, he would like to hold on that for now  He is asymptomatic and does not require therapy at this time  3   Snoring/daytime sleepiness - , Body mass index is 28 12 kg/m² , Neck Circumference: 42         -  He would benefit from a sleeps study but at this time, due to cost issues he would like to hold off  Can follow in pulmonary clinic in 1 year to follow repeat CT to screen for lung cancer  History of Present Illness   HPI:  Jeri Hawk is a 58 y o  male with PMHx as below who comes in for evaluation of pulmonary nodules on CT  He denies any dyspnea, chest tightness, cough, wheezing, night sweats, hemoptysis, weight changes of fatigue  He states that he is asymptomatic and that the nodules were found incidentally years ago as he went for CT abdomen for belly pain  Repeat CT chest shows stability of those nodules  He does admit to some snoring and daytime sleepiness  He denies witnessed apneas, morning headaches or dry mouth upon awakenings          ROS:   Review of Systems  Genitourinary need to urinate more than twice a night   Cardiology none   Gastrointestinal frequent heartburn/acid reflux   Neurology none   Constitutional none   Integumentary none   Psychiatry none   Musculoskeletal back pain   Pulmonary snoring   ENT ringing in ears   Endocrine none   Hematological blood donor        Historical Information   Past Medical History:   Diagnosis Date    Alcohol abuse, in remission     Drug dependence, in remission ([facility] [address] )     Head injury     CLOSED HEAD INJURY WITH CONCUSSION     Past Surgical History:   Procedure Laterality Date    COLONOSCOPY      COMPLETE LAST ASSESSED: [id number]    ELBOW SURGERY      LAST ASSESSED: [id number]    NASAL SEPTUM SURGERY      DEVIATION REPAIR LAST ASSESSED: [id number]    TONSILLECTOMY      LAST ASSESSED: [id number]     Family History   Problem Relation Age of Onset    Other Mother         HEADACHE    Heart disease Mother     Hypertension Mother     Hyperlipidemia Mother         PURE    Bipolar disorder Daughter         NOS    Other Daughter         HEADACHE    Irritable bowel syndrome Daughter      Social History     Social History    Marital status: Single     Spouse name: N/A    Number of children: N/A    Years of education: N/A     Occupational History          EMPLOYED     Social History Main Topics    Smoking status: Former Smoker     Types: Cigars    Smokeless tobacco: Former User      Comment: NO SECONHAND SMOKE EXPOSURE    Alcohol use No      Comment: RECOVERING ALCOHOLIC SINCE 5/4806  DAILY ALCOHOL CONSUMPTIO WAS 30 BEERS PER DAY   Drug use: No      Comment: FORMERLY ADDICTED TO COCAINE, CRACK COCAINE, FORMERLY USED MARIJUANA REGULALRLY, AND USED LSD    Sexual activity: Not on file     Other Topics Concern    Not on file     Social History Narrative    CAFFEINE USE - CONSUMES ON AVERAGE 3 CUPS OF COFFEE PER DAY     AS PER [employer]       Occupational History: welding, previously in a foundry    Meds/Allergies   No Known Allergies    Home medications:  Prior to Admission medications    Medication Sig Start Date End Date Taking?  Authorizing Provider   amLODIPine (NORVASC) 10 mg tablet Take 1 tablet (10 mg total) by mouth daily 3/9/18  Yes Katerin Rey Jakob Walsh PA-C   atorvastatin (LIPITOR) 20 mg tablet Take 1 tablet (20 mg total) by mouth daily 6/27/18  Yes Marbella Carson PA-C   diclofenac sodium (VOLTAREN) 1 % Apply 2 g topically 4 (four) times a day 6/27/18  Yes Marbella Carson PA-C   losartan (COZAAR) 25 mg tablet Take 1 tablet (25 mg total) by mouth daily 3/9/18  Yes Ari London PA-C   SUMAtriptan (IMITREX) 100 mg tablet Take by mouth   Yes Historical Provider, MD   tadalafil (CIALIS) 10 MG tablet Take 1 tablet (10 mg total) by mouth daily as needed for erectile dysfunction 6/27/18  Yes Marbella Carson PA-C   tamsulosin [facility]) 0 4 mg Take 1 capsule (0 4 mg total) by mouth daily 3/9/18  Yes Ari London PA-C       Vitals:   Blood pressure 114/78, pulse 80, height 5' 10" (1 778 m), weight 88 9 kg (196 lb)  , RA, Body mass index is 28 12 kg/m²  Neck Circumference: 42    Physical Exam  General: Pleasant, disheveled, anxious apperaing, Awake alert and oriented x 3, conversant without conversational dyspnea, NAD, normal affect  HEENT:  PERRL, Sclera noninjected, nonicteric OU, Nares patent,  no craniofacial abnormalities, Mucous membranes, moist, no oral lesions, normal dentition  Mallampati class 4  NECK: Trachea midline, no accessory muscle use, no stridor, no cervical or supraclavicular adenopathy, JVP not elevated  CARDIAC: Reg, single s1/S2, no m/r/g  PULM: Decreased breath sounds but no wheezing, rhonchi or rales  ABD: Normoactive bowel sounds, soft nontender, nondistended, no rebound, no rigidity, no guarding  EXT: No cyanosis, no clubbing, no edema, normal capillary refill  NEURO: no focal neurologic deficits, AAOx3, moving all extremities appropriately    Labs: I have personally reviewed pertinent lab results    Lab Results   Component Value Date    WBC 5 0 06/02/2018    HGB 16 2 06/02/2018    HCT 46 3 06/02/2018    MCV 91 1 06/02/2018     06/02/2018      Lab Results   Component Value Date    CALCIUM 9 2 06/02/2018     11/14/2017    K 4 2 06/02/2018    CO2 25 06/02/2018     06/02/2018    BUN 22 06/02/2018    CREATININE 1 12 11/14/2017       PFTs:  The most recent pulmonary function tests were reviewed  None performed    Imaging  I personally reviewed the images on the [facility] system pertinent to today's visit  7/5/18  IMPRESSION:  5 mm lingular nodule, and 3 mm left basilar nodule      Lung-RADS:  Lung-RADS2, benign appearance or behavior  Continue annual screening with LDCT in 12 months  CT abdomen/pelvis 6/2/16   LUNG BASES:  Lingular nodule measures 4 mm  Left lower lobe subpleural nodule measures 3 mm      DO Nora Laurent 73 Sleep Physician

## 2018-11-15 DIAGNOSIS — M19.071 PRIMARY OSTEOARTHRITIS OF RIGHT FOOT: ICD-10-CM

## 2018-12-06 LAB
ALBUMIN SERPL-MCNC: 4.3 G/DL (ref 3.6–5.1)
ALBUMIN/GLOB SERPL: 1.7 (CALC) (ref 1–2.5)
ALP SERPL-CCNC: 42 U/L (ref 40–115)
ALT SERPL-CCNC: 35 U/L (ref 9–46)
AST SERPL-CCNC: 25 U/L (ref 10–35)
BILIRUB SERPL-MCNC: 0.6 MG/DL (ref 0.2–1.2)
BUN SERPL-MCNC: 24 MG/DL (ref 7–25)
BUN/CREAT SERPL: ABNORMAL (CALC) (ref 6–22)
CALCIUM SERPL-MCNC: 9.1 MG/DL (ref 8.6–10.3)
CHLORIDE SERPL-SCNC: 103 MMOL/L (ref 98–110)
CHOLEST SERPL-MCNC: 148 MG/DL
CHOLEST/HDLC SERPL: 4 (CALC)
CO2 SERPL-SCNC: 29 MMOL/L (ref 20–32)
CREAT SERPL-MCNC: 1.17 MG/DL (ref 0.7–1.25)
GLOBULIN SER CALC-MCNC: 2.5 G/DL (CALC) (ref 1.9–3.7)
GLUCOSE SERPL-MCNC: 121 MG/DL (ref 65–99)
HBA1C MFR BLD: 6 % OF TOTAL HGB
HDLC SERPL-MCNC: 37 MG/DL
LDLC SERPL CALC-MCNC: 88 MG/DL (CALC)
NONHDLC SERPL-MCNC: 111 MG/DL (CALC)
POTASSIUM SERPL-SCNC: 4.2 MMOL/L (ref 3.5–5.3)
PROT SERPL-MCNC: 6.8 G/DL (ref 6.1–8.1)
SL AMB EGFR AFRICAN AMERICAN: 76 ML/MIN/1.73M2
SL AMB EGFR NON AFRICAN AMERICAN: 66 ML/MIN/1.73M2
SODIUM SERPL-SCNC: 138 MMOL/L (ref 135–146)
TRIGL SERPL-MCNC: 129 MG/DL

## 2018-12-10 ENCOUNTER — OFFICE VISIT (OUTPATIENT)
Dept: FAMILY MEDICINE CLINIC | Facility: CLINIC | Age: 63
End: 2018-12-10
Payer: COMMERCIAL

## 2018-12-10 VITALS
SYSTOLIC BLOOD PRESSURE: 112 MMHG | WEIGHT: 203 LBS | HEART RATE: 84 BPM | BODY MASS INDEX: 29.06 KG/M2 | DIASTOLIC BLOOD PRESSURE: 72 MMHG | HEIGHT: 70 IN

## 2018-12-10 DIAGNOSIS — E78.2 MIXED HYPERLIPIDEMIA: ICD-10-CM

## 2018-12-10 DIAGNOSIS — Z12.11 SCREEN FOR COLON CANCER: ICD-10-CM

## 2018-12-10 DIAGNOSIS — I10 ESSENTIAL HYPERTENSION: Primary | ICD-10-CM

## 2018-12-10 DIAGNOSIS — R91.8 PULMONARY NODULES: ICD-10-CM

## 2018-12-10 DIAGNOSIS — N52.9 ERECTILE DYSFUNCTION, UNSPECIFIED ERECTILE DYSFUNCTION TYPE: ICD-10-CM

## 2018-12-10 DIAGNOSIS — M19.071 PRIMARY OSTEOARTHRITIS OF RIGHT FOOT: ICD-10-CM

## 2018-12-10 DIAGNOSIS — R73.9 HYPERGLYCEMIA: ICD-10-CM

## 2018-12-10 DIAGNOSIS — Z12.5 SCREENING FOR PROSTATE CANCER: ICD-10-CM

## 2018-12-10 PROCEDURE — 3008F BODY MASS INDEX DOCD: CPT | Performed by: FAMILY MEDICINE

## 2018-12-10 PROCEDURE — 99214 OFFICE O/P EST MOD 30 MIN: CPT | Performed by: FAMILY MEDICINE

## 2018-12-10 PROCEDURE — 1036F TOBACCO NON-USER: CPT | Performed by: FAMILY MEDICINE

## 2018-12-10 PROCEDURE — 3078F DIAST BP <80 MM HG: CPT | Performed by: FAMILY MEDICINE

## 2018-12-10 PROCEDURE — 3074F SYST BP LT 130 MM HG: CPT | Performed by: FAMILY MEDICINE

## 2018-12-10 RX ORDER — TADALAFIL 10 MG/1
10 TABLET ORAL DAILY PRN
Qty: 90 TABLET | Refills: 0 | Status: SHIPPED | OUTPATIENT
Start: 2018-12-10 | End: 2018-12-10 | Stop reason: SDUPTHER

## 2018-12-10 RX ORDER — TADALAFIL 10 MG/1
10 TABLET ORAL DAILY PRN
Qty: 90 TABLET | Refills: 3 | Status: SHIPPED | OUTPATIENT
Start: 2018-12-10 | End: 2020-01-13 | Stop reason: SDUPTHER

## 2018-12-10 RX ORDER — LORATADINE 10 MG/1
CAPSULE, LIQUID FILLED ORAL
COMMUNITY

## 2018-12-10 NOTE — PROGRESS NOTES
Assessment/Plan:    Erectile dysfunction  Patient's Cialis prescription was renewed    Hyperglycemia  His fasting sugar stayed the same at 121 and his hemoglobin A1c went up from 5 8-6 0%    Hyperlipidemia  His cholesterol numbers improved by increasing his Lipitor from 10 mg daily to 20 mg daily  His LDL came down from 105 to 88  Hypertension  His blood pressure is 112/72 and he is on amlodipine 10 mg daily and losartan 25 mg daily  Primary osteoarthritis of right foot  He uses Voltaren gel when his foot pain gets worse and it seems to settle the pain down  There are some days when he has no pain at all  Pulmonary nodules  He sees pulmonology and his CT scan was done on July 5, 2018  No change in the nodules  Screen for colon cancer  Hemoccults are ordered for his labs in 6 months    Screening for prostate cancer  A PSA is ordered with his labs in 6 months  Diagnoses and all orders for this visit:    Essential hypertension  -     CBC and differential; Future  -     Comprehensive metabolic panel; Future  -     Hemoglobin A1C; Future  -     Lipid Panel with Direct LDL reflex; Future  -     TSH, 3rd generation; Future    Hyperglycemia  -     CBC and differential; Future  -     Comprehensive metabolic panel; Future  -     Hemoglobin A1C; Future  -     Lipid Panel with Direct LDL reflex; Future  -     TSH, 3rd generation; Future    Mixed hyperlipidemia  -     CBC and differential; Future  -     Comprehensive metabolic panel; Future  -     Hemoglobin A1C; Future  -     Lipid Panel with Direct LDL reflex; Future  -     TSH, 3rd generation; Future    Erectile dysfunction, unspecified erectile dysfunction type  -     Discontinue: tadalafil (CIALIS) 10 MG tablet; Take 1 tablet (10 mg total) by mouth daily as needed for erectile dysfunction  -     CBC and differential; Future  -     Comprehensive metabolic panel; Future  -     Hemoglobin A1C; Future  -     Lipid Panel with Direct LDL reflex;  Future  - TSH, 3rd generation; Future  -     tadalafil (CIALIS) 10 MG tablet; Take 1 tablet (10 mg total) by mouth daily as needed for erectile dysfunction    Screen for colon cancer  -     Occult Blood, Fecal Immunochemical; Future  -     PSA, Total Screen; Future    Screening for prostate cancer  -     PSA, Total Screen; Future    Pulmonary nodules    Primary osteoarthritis of right foot    Other orders  -     Multiple Vitamins-Minerals (ONE-A-DAY 50 PLUS PO); Take by mouth  -     Loratadine 10 MG CAPS; Take by mouth          Subjective: Follow up hyperglycemia, hyperlipidemia, HTN  Review BW results  Pt declines flu immunization  -  [facility]     Patient ID: Santy Davis is a 61 y o  male  This is a 28-year-old male who comes in for his regular 6 month visit  He is feeling well with no complaints or problems  His blood pressure is 112/72 and his weight is up 7 lb from the previous visit to 203 lb  We reviewed his BMI which is 29 1 and he does not watch his diet and is not going to watch his diet  He does not do much in the way of exercise  He sees a pulmonologist for his lung nodules and had his last CT scan of the lungs on July 5, 2018  The following portions of the patient's history were reviewed and updated as appropriate: allergies, current medications, past family history, past medical history, past social history, past surgical history and problem list     Review of Systems   Constitutional: Negative  HENT: Negative  Eyes: Negative  Respiratory: Negative  Cardiovascular: Negative  Gastrointestinal: Negative  Endocrine: Negative  Genitourinary: Negative  Musculoskeletal: Negative  Skin: Negative  Allergic/Immunologic: Negative  Neurological: Negative  Hematological: Negative  Psychiatric/Behavioral: Negative            Objective:      /72 (BP Location: Left arm, Patient Position: Sitting, Cuff Size: Large)   Pulse 84   Ht 5' 10" (1 778 m)   Wt 92 1 kg (203 lb)   BMI 29 13 kg/m²          Physical Exam   Constitutional: He is oriented to person, place, and time  He appears well-developed and well-nourished  HENT:   Head: Normocephalic  Right Ear: External ear normal    Left Ear: External ear normal    Mouth/Throat: Oropharynx is clear and moist    Eyes: Pupils are equal, round, and reactive to light  Conjunctivae and EOM are normal    Neck: Normal range of motion  Neck supple  Cardiovascular: Normal rate, regular rhythm and normal heart sounds  Pulmonary/Chest: Effort normal and breath sounds normal    Abdominal: Soft  Bowel sounds are normal    Musculoskeletal: Normal range of motion  Neurological: He is alert and oriented to person, place, and time  Skin: Skin is warm  Psychiatric: He has a normal mood and affect  His behavior is normal  Judgment and thought content normal    Nursing note and vitals reviewed

## 2018-12-10 NOTE — ASSESSMENT & PLAN NOTE
His cholesterol numbers improved by increasing his Lipitor from 10 mg daily to 20 mg daily  His LDL came down from 105 to 88

## 2018-12-10 NOTE — ASSESSMENT & PLAN NOTE
He uses Voltaren gel when his foot pain gets worse and it seems to settle the pain down  There are some days when he has no pain at all

## 2019-01-21 DIAGNOSIS — R06.83 SNORING: ICD-10-CM

## 2019-01-21 DIAGNOSIS — E78.2 MIXED HYPERLIPIDEMIA: ICD-10-CM

## 2019-01-21 DIAGNOSIS — I10 ESSENTIAL HYPERTENSION: ICD-10-CM

## 2019-01-21 RX ORDER — LOSARTAN POTASSIUM 25 MG/1
TABLET ORAL
Qty: 90 TABLET | Refills: 3 | Status: SHIPPED | OUTPATIENT
Start: 2019-01-21 | End: 2019-12-05 | Stop reason: SDUPTHER

## 2019-01-21 RX ORDER — AMLODIPINE BESYLATE 10 MG/1
TABLET ORAL
Qty: 90 TABLET | Refills: 3 | Status: SHIPPED | OUTPATIENT
Start: 2019-01-21 | End: 2019-12-05 | Stop reason: SDUPTHER

## 2019-01-21 RX ORDER — TAMSULOSIN HYDROCHLORIDE 0.4 MG/1
CAPSULE ORAL
Qty: 90 CAPSULE | Refills: 3 | Status: SHIPPED | OUTPATIENT
Start: 2019-01-21 | End: 2019-12-05 | Stop reason: SDUPTHER

## 2019-05-12 DIAGNOSIS — I10 ESSENTIAL HYPERTENSION: ICD-10-CM

## 2019-05-12 DIAGNOSIS — E78.2 MIXED HYPERLIPIDEMIA: ICD-10-CM

## 2019-05-13 RX ORDER — ATORVASTATIN CALCIUM 20 MG/1
TABLET, FILM COATED ORAL
Qty: 90 TABLET | Refills: 3 | Status: SHIPPED | OUTPATIENT
Start: 2019-05-13 | End: 2020-04-17 | Stop reason: SDUPTHER

## 2019-06-20 LAB
ALBUMIN SERPL-MCNC: 4.2 G/DL (ref 3.6–5.1)
ALBUMIN/GLOB SERPL: 1.6 (CALC) (ref 1–2.5)
ALP SERPL-CCNC: 43 U/L (ref 40–115)
ALT SERPL-CCNC: 37 U/L (ref 9–46)
AST SERPL-CCNC: 23 U/L (ref 10–35)
BASOPHILS # BLD AUTO: 31 CELLS/UL (ref 0–200)
BASOPHILS NFR BLD AUTO: 0.6 %
BILIRUB SERPL-MCNC: 0.6 MG/DL (ref 0.2–1.2)
BUN SERPL-MCNC: 25 MG/DL (ref 7–25)
BUN/CREAT SERPL: ABNORMAL (CALC) (ref 6–22)
CALCIUM SERPL-MCNC: 9.3 MG/DL (ref 8.6–10.3)
CHLORIDE SERPL-SCNC: 105 MMOL/L (ref 98–110)
CHOLEST SERPL-MCNC: 164 MG/DL
CHOLEST/HDLC SERPL: 4.4 (CALC)
CO2 SERPL-SCNC: 27 MMOL/L (ref 20–32)
CREAT SERPL-MCNC: 1.09 MG/DL (ref 0.7–1.25)
EOSINOPHIL # BLD AUTO: 82 CELLS/UL (ref 15–500)
EOSINOPHIL NFR BLD AUTO: 1.6 %
ERYTHROCYTE [DISTWIDTH] IN BLOOD BY AUTOMATED COUNT: 12.8 % (ref 11–15)
GLOBULIN SER CALC-MCNC: 2.6 G/DL (CALC) (ref 1.9–3.7)
GLUCOSE SERPL-MCNC: 118 MG/DL (ref 65–99)
HBA1C MFR BLD: 6.4 % OF TOTAL HGB
HCT VFR BLD AUTO: 46 % (ref 38.5–50)
HDLC SERPL-MCNC: 37 MG/DL
HGB BLD-MCNC: 15.6 G/DL (ref 13.2–17.1)
LDLC SERPL CALC-MCNC: 99 MG/DL (CALC)
LYMPHOCYTES # BLD AUTO: 1479 CELLS/UL (ref 850–3900)
LYMPHOCYTES NFR BLD AUTO: 29 %
MCH RBC QN AUTO: 31 PG (ref 27–33)
MCHC RBC AUTO-ENTMCNC: 33.9 G/DL (ref 32–36)
MCV RBC AUTO: 91.5 FL (ref 80–100)
MONOCYTES # BLD AUTO: 408 CELLS/UL (ref 200–950)
MONOCYTES NFR BLD AUTO: 8 %
NEUTROPHILS # BLD AUTO: 3101 CELLS/UL (ref 1500–7800)
NEUTROPHILS NFR BLD AUTO: 60.8 %
NONHDLC SERPL-MCNC: 127 MG/DL (CALC)
PLATELET # BLD AUTO: 143 THOUSAND/UL (ref 140–400)
PMV BLD REES-ECKER: 9.7 FL (ref 7.5–12.5)
POTASSIUM SERPL-SCNC: 4.5 MMOL/L (ref 3.5–5.3)
PROT SERPL-MCNC: 6.8 G/DL (ref 6.1–8.1)
PSA SERPL-MCNC: 0.6 NG/ML
RBC # BLD AUTO: 5.03 MILLION/UL (ref 4.2–5.8)
SL AMB EGFR AFRICAN AMERICAN: 83 ML/MIN/1.73M2
SL AMB EGFR NON AFRICAN AMERICAN: 72 ML/MIN/1.73M2
SODIUM SERPL-SCNC: 139 MMOL/L (ref 135–146)
TRIGL SERPL-MCNC: 181 MG/DL
TSH SERPL-ACNC: 1.37 MIU/L (ref 0.4–4.5)
WBC # BLD AUTO: 5.1 THOUSAND/UL (ref 3.8–10.8)

## 2019-06-30 LAB — HEMOCCULT STL QL IA: NOT DETECTED

## 2019-07-26 ENCOUNTER — OFFICE VISIT (OUTPATIENT)
Dept: FAMILY MEDICINE CLINIC | Facility: CLINIC | Age: 64
End: 2019-07-26
Payer: COMMERCIAL

## 2019-07-26 VITALS
HEART RATE: 82 BPM | WEIGHT: 193 LBS | SYSTOLIC BLOOD PRESSURE: 104 MMHG | BODY MASS INDEX: 27.63 KG/M2 | DIASTOLIC BLOOD PRESSURE: 70 MMHG | HEIGHT: 70 IN

## 2019-07-26 DIAGNOSIS — R73.9 HYPERGLYCEMIA: Primary | ICD-10-CM

## 2019-07-26 DIAGNOSIS — R91.8 PULMONARY NODULES: ICD-10-CM

## 2019-07-26 DIAGNOSIS — L98.9 SKIN LESIONS: ICD-10-CM

## 2019-07-26 DIAGNOSIS — E78.2 MIXED HYPERLIPIDEMIA: ICD-10-CM

## 2019-07-26 DIAGNOSIS — I10 ESSENTIAL HYPERTENSION: ICD-10-CM

## 2019-07-26 PROCEDURE — 3008F BODY MASS INDEX DOCD: CPT | Performed by: FAMILY MEDICINE

## 2019-07-26 PROCEDURE — 3074F SYST BP LT 130 MM HG: CPT | Performed by: FAMILY MEDICINE

## 2019-07-26 PROCEDURE — 99214 OFFICE O/P EST MOD 30 MIN: CPT | Performed by: FAMILY MEDICINE

## 2019-07-26 PROCEDURE — 1036F TOBACCO NON-USER: CPT | Performed by: FAMILY MEDICINE

## 2019-07-26 NOTE — PROGRESS NOTES
Assessment and Plan:    Problem List Items Addressed This Visit        Cardiovascular and Mediastinum    Essential hypertension     His blood pressure is stable at 100 4/70 and he is on amlodipine 10 mg daily and losartan 25 mg daily  Other    Hyperglycemia - Primary     His glucose came down from 121 to 118 but his hemoglobin A1c went up from 6 0 to 6 4%  Relevant Orders    Comprehensive metabolic panel    Hemoglobin A1C    Lipid Panel with Direct LDL reflex    Mixed hyperlipidemia     His cholesterol numbers are stable but his triglycerides went up from 129 to 181  His LDL is 99 and he is on a atorvastatin 20 mg daily  Relevant Orders    Comprehensive metabolic panel    Hemoglobin A1C    Lipid Panel with Direct LDL reflex    Pulmonary nodules     A CT scan of the chest is ordered  Relevant Orders    CT chest wo contrast    Comprehensive metabolic panel    Hemoglobin A1C    Lipid Panel with Direct LDL reflex      Other Visit Diagnoses     Skin lesions        Relevant Orders    Ambulatory referral to Dermatology    BMI 27 0-27 9,adult                     Diagnoses and all orders for this visit:    Hyperglycemia  -     Comprehensive metabolic panel; Future  -     Hemoglobin A1C; Future  -     Lipid Panel with Direct LDL reflex; Future    Mixed hyperlipidemia  -     Comprehensive metabolic panel; Future  -     Hemoglobin A1C; Future  -     Lipid Panel with Direct LDL reflex; Future    Pulmonary nodules  -     CT chest wo contrast; Future  -     Comprehensive metabolic panel; Future  -     Hemoglobin A1C; Future  -     Lipid Panel with Direct LDL reflex; Future    Skin lesions  -     Ambulatory referral to Dermatology; Future    BMI 27 0-27 9,adult    Essential hypertension              Subjective:      Patient ID: Roni Armas is a 61 y o  male      CC:    Chief Complaint   Patient presents with    Hyperglycemia    Hyperlipidemia     Review BW results    Hypertension    Abdominal Pain     pt has been having "stomach issues for years"  He has been using Tums more frequently lately  -  [facility]       HPI:    This is a 51-year-old male who comes in for his regular 6 month visit  He is feeling well  He has some skin lesions especially on his face which she would like to see Dermatology  A referral will be made to Dermatology for a skin body check  He also is due for his annual CT scan of the chest for pulmonary nodules and that referral will be given to him  He is also complaining of some indigestion which Tums helps but he is taking too many times in his estimation  He will try Zantac 150 mg twice a day and see if that helps his indigestion  He declines any testing or GI consult at this time  His blood pressure is 104/70 and his weight is down 10 lb from the previous visit to 193 lb and his BMI is 27 6  Reviewing his labs his hemoccults are negative, his hemoglobin A1c went up from 6 0 to 6 4%, his glucose came down from 121 to 118, CBC and TSH are both within normal range and his PSA is 0 6  His triglycerides went up from 129 to 181 and his LDL is 99  The following portions of the patient's history were reviewed and updated as appropriate: allergies, current medications, past family history, past medical history, past social history, past surgical history and problem list       Review of Systems   Constitutional: Negative  HENT: Negative  Eyes: Negative  Respiratory: Negative  Cardiovascular: Negative  Gastrointestinal: Negative  Endocrine: Negative  Genitourinary: Negative  Musculoskeletal: Negative  Skin: Negative  Multiple skin lesions on trunk and face-dermatology consult  Allergic/Immunologic: Negative  Neurological: Negative  Hematological: Negative  Psychiatric/Behavioral: Negative            Data to review:       Objective:    Vitals:    07/26/19 0738   BP: 104/70   BP Location: Left arm   Patient Position: Sitting Cuff Size: Large   Pulse: 82   Weight: 87 5 kg (193 lb)   Height: 5' 10" (1 778 m)        Physical Exam   Constitutional: He is oriented to person, place, and time  He appears well-developed and well-nourished  HENT:   Head: Normocephalic  Right Ear: External ear normal    Left Ear: External ear normal    Mouth/Throat: Oropharynx is clear and moist    Eyes: Pupils are equal, round, and reactive to light  Conjunctivae and EOM are normal    Neck: Normal range of motion  Neck supple  Cardiovascular: Normal rate, regular rhythm and normal heart sounds  Pulmonary/Chest: Effort normal and breath sounds normal    Abdominal: Soft  Bowel sounds are normal  There is no tenderness  Musculoskeletal: Normal range of motion  Neurological: He is alert and oriented to person, place, and time  Skin: Skin is warm  Multiple skin lesions on trunk and face  Psychiatric: He has a normal mood and affect  His behavior is normal  Judgment and thought content normal    Nursing note and vitals reviewed  BMI Counseling: Body mass index is 27 69 kg/m²  Discussed the patient's BMI with him  The BMI is above average  BMI counseling and education was provided to the patient  Nutrition recommendations include reducing portion sizes, decreasing overall calorie intake, 3-5 servings of fruits/vegetables daily, reducing fast food intake, consuming healthier snacks, decreasing soda and/or juice intake, moderation in carbohydrate intake, increasing intake of lean protein and reducing intake of cholesterol  Exercise recommendations include moderate aerobic physical activity for 150 minutes/week

## 2019-07-26 NOTE — ASSESSMENT & PLAN NOTE
His blood pressure is stable at 100 4/70 and he is on amlodipine 10 mg daily and losartan 25 mg daily

## 2019-07-26 NOTE — ASSESSMENT & PLAN NOTE
His cholesterol numbers are stable but his triglycerides went up from 129 to 181  His LDL is 99 and he is on a atorvastatin 20 mg daily

## 2019-07-26 NOTE — PATIENT INSTRUCTIONS

## 2019-12-05 DIAGNOSIS — E78.2 MIXED HYPERLIPIDEMIA: ICD-10-CM

## 2019-12-05 DIAGNOSIS — R06.83 SNORING: ICD-10-CM

## 2019-12-05 DIAGNOSIS — I10 ESSENTIAL HYPERTENSION: ICD-10-CM

## 2019-12-05 RX ORDER — AMLODIPINE BESYLATE 10 MG/1
TABLET ORAL
Qty: 90 TABLET | Refills: 3 | Status: SHIPPED | OUTPATIENT
Start: 2019-12-05 | End: 2020-04-17 | Stop reason: SDUPTHER

## 2019-12-05 RX ORDER — TAMSULOSIN HYDROCHLORIDE 0.4 MG/1
CAPSULE ORAL
Qty: 90 CAPSULE | Refills: 3 | Status: SHIPPED | OUTPATIENT
Start: 2019-12-05 | End: 2020-04-17 | Stop reason: SDUPTHER

## 2019-12-05 RX ORDER — LOSARTAN POTASSIUM 25 MG/1
TABLET ORAL
Qty: 90 TABLET | Refills: 3 | Status: SHIPPED | OUTPATIENT
Start: 2019-12-05 | End: 2020-04-20 | Stop reason: SDUPTHER

## 2019-12-31 LAB
ALBUMIN SERPL-MCNC: 4.4 G/DL (ref 3.6–5.1)
ALBUMIN/GLOB SERPL: 1.9 (CALC) (ref 1–2.5)
ALP SERPL-CCNC: 46 U/L (ref 40–115)
ALT SERPL-CCNC: 32 U/L (ref 9–46)
AST SERPL-CCNC: 25 U/L (ref 10–35)
BILIRUB SERPL-MCNC: 0.6 MG/DL (ref 0.2–1.2)
BUN SERPL-MCNC: 24 MG/DL (ref 7–25)
BUN/CREAT SERPL: ABNORMAL (CALC) (ref 6–22)
CALCIUM SERPL-MCNC: 9.6 MG/DL (ref 8.6–10.3)
CHLORIDE SERPL-SCNC: 103 MMOL/L (ref 98–110)
CHOLEST SERPL-MCNC: 169 MG/DL
CHOLEST/HDLC SERPL: 4.3 (CALC)
CO2 SERPL-SCNC: 29 MMOL/L (ref 20–32)
CREAT SERPL-MCNC: 1.25 MG/DL (ref 0.7–1.25)
GLOBULIN SER CALC-MCNC: 2.3 G/DL (CALC) (ref 1.9–3.7)
GLUCOSE SERPL-MCNC: 117 MG/DL (ref 65–99)
HBA1C MFR BLD: 6.1 % OF TOTAL HGB
HDLC SERPL-MCNC: 39 MG/DL
LDLC SERPL CALC-MCNC: 99 MG/DL (CALC)
NONHDLC SERPL-MCNC: 130 MG/DL (CALC)
POTASSIUM SERPL-SCNC: 4.4 MMOL/L (ref 3.5–5.3)
PROT SERPL-MCNC: 6.7 G/DL (ref 6.1–8.1)
SL AMB EGFR AFRICAN AMERICAN: 70 ML/MIN/1.73M2
SL AMB EGFR NON AFRICAN AMERICAN: 60 ML/MIN/1.73M2
SODIUM SERPL-SCNC: 141 MMOL/L (ref 135–146)
TRIGL SERPL-MCNC: 195 MG/DL

## 2020-01-13 ENCOUNTER — TELEPHONE (OUTPATIENT)
Dept: FAMILY MEDICINE CLINIC | Facility: CLINIC | Age: 65
End: 2020-01-13

## 2020-01-13 DIAGNOSIS — N52.9 ERECTILE DYSFUNCTION, UNSPECIFIED ERECTILE DYSFUNCTION TYPE: ICD-10-CM

## 2020-01-13 RX ORDER — TADALAFIL 10 MG/1
10 TABLET ORAL DAILY PRN
Qty: 90 TABLET | Refills: 0 | Status: SHIPPED | OUTPATIENT
Start: 2020-01-13 | End: 2020-02-17 | Stop reason: SDUPTHER

## 2020-01-13 NOTE — TELEPHONE ENCOUNTER
BT - PT IS ASKING IF YOU WOULD PLEASE WRITE HIM A SCRIPT FOR HIS CIALIS (TADALAFIL) 10MG, 90 DAY SUPPLY AND THE SCRIPT SHOULD BE GOOD FOR 1 YEAR  HE IS ASKING IF YOU COULD MAIL THE SCRIPT TO HIM  AT HIS HOME ADDRESS    ANY QUESTION PT CAN BE REACHED [phone]

## 2020-02-17 ENCOUNTER — OFFICE VISIT (OUTPATIENT)
Dept: FAMILY MEDICINE CLINIC | Facility: CLINIC | Age: 65
End: 2020-02-17
Payer: COMMERCIAL

## 2020-02-17 ENCOUNTER — TELEPHONE (OUTPATIENT)
Dept: ADMINISTRATIVE | Facility: OTHER | Age: 65
End: 2020-02-17

## 2020-02-17 VITALS
HEART RATE: 88 BPM | SYSTOLIC BLOOD PRESSURE: 110 MMHG | HEIGHT: 70 IN | DIASTOLIC BLOOD PRESSURE: 76 MMHG | BODY MASS INDEX: 28.06 KG/M2 | WEIGHT: 196 LBS

## 2020-02-17 DIAGNOSIS — Z12.5 SCREENING FOR PROSTATE CANCER: ICD-10-CM

## 2020-02-17 DIAGNOSIS — E78.2 MIXED HYPERLIPIDEMIA: ICD-10-CM

## 2020-02-17 DIAGNOSIS — N52.9 ERECTILE DYSFUNCTION, UNSPECIFIED ERECTILE DYSFUNCTION TYPE: ICD-10-CM

## 2020-02-17 DIAGNOSIS — R73.9 HYPERGLYCEMIA: ICD-10-CM

## 2020-02-17 DIAGNOSIS — I10 ESSENTIAL HYPERTENSION: ICD-10-CM

## 2020-02-17 DIAGNOSIS — M19.071 PRIMARY OSTEOARTHRITIS OF RIGHT FOOT: Primary | ICD-10-CM

## 2020-02-17 PROBLEM — Z98.890 S/P COLONOSCOPY: Status: ACTIVE | Noted: 2020-02-17

## 2020-02-17 PROBLEM — R09.82 POSTNASAL DRIP: Status: ACTIVE | Noted: 2020-02-17

## 2020-02-17 PROCEDURE — 99214 OFFICE O/P EST MOD 30 MIN: CPT | Performed by: FAMILY MEDICINE

## 2020-02-17 PROCEDURE — 1036F TOBACCO NON-USER: CPT | Performed by: FAMILY MEDICINE

## 2020-02-17 PROCEDURE — 3008F BODY MASS INDEX DOCD: CPT | Performed by: FAMILY MEDICINE

## 2020-02-17 PROCEDURE — 3078F DIAST BP <80 MM HG: CPT | Performed by: FAMILY MEDICINE

## 2020-02-17 PROCEDURE — 3074F SYST BP LT 130 MM HG: CPT | Performed by: FAMILY MEDICINE

## 2020-02-17 RX ORDER — TADALAFIL 10 MG/1
10 TABLET ORAL DAILY PRN
Qty: 90 TABLET | Refills: 3 | Status: SHIPPED | OUTPATIENT
Start: 2020-02-17 | End: 2020-09-03 | Stop reason: SDUPTHER

## 2020-02-17 RX ORDER — PREDNISONE 10 MG/1
TABLET ORAL
Qty: 36 TABLET | Refills: 0 | Status: SHIPPED | OUTPATIENT
Start: 2020-02-17 | End: 2021-01-04 | Stop reason: ALTCHOICE

## 2020-02-17 NOTE — TELEPHONE ENCOUNTER
----- Message from Saniya Jerez sent at 2/14/2020  8:35 AM EST -----  Regarding: COLONOSCOPY  02/14/20 8:36 AM    Hello, our patient Guru Murrell has had CRC: Colonoscopy completed/performed  Please assist in updating the patient chart by pulling the document from the Media Tab  The date of service is 12/30/2016       Thank you,  Saniya [facility]ARC[facility]

## 2020-02-17 NOTE — TELEPHONE ENCOUNTER
Upon review of the In Basket request we were able to locate, review, and update the patient chart as requested for CRC: Colonoscopy  Any additional questions or concerns should be emailed to the Practice Liaisons via [email or web address] email, please do not reply via In Basket      Thank you  Evangelista Collins

## 2020-02-17 NOTE — ASSESSMENT & PLAN NOTE
His fasting sugar remains about the same at 1:17 a m  And his hemoglobin A1c came down from 6 4% to 6 1%

## 2020-02-17 NOTE — ASSESSMENT & PLAN NOTE
His triglycerides went up from 181 to 195 because he has been eating a lot of ice cream recently  His LDL is stable at 99  He is currently on atorvastatin 20 mg daily and takes it every day

## 2020-02-17 NOTE — ASSESSMENT & PLAN NOTE
He was placed on prednisone 10 mg tablets: 5 tablets-2 days, 4 tablets for 3 days, 3 tablets for 3 days, 2 tablets for 2 days and 1 tablet for 1 day

## 2020-02-17 NOTE — PROGRESS NOTES
Assessment and Plan:  Follow-up in 9 months with labs  He is returning in 9 months because his current insurance will be changed  Problem List Items Addressed This Visit        Cardiovascular and Mediastinum    Essential hypertension     His blood pressure is stable at 1 10/76 and he is on Norvasc 10 mg daily and losartan 25 mg daily  Relevant Orders    CBC and differential    Comprehensive metabolic panel    Hemoglobin A1C    Lipid Panel with Direct LDL reflex    TSH, 3rd generation    PSA, Total Screen       Musculoskeletal and Integument    Primary osteoarthritis of right foot - Primary     He was placed on prednisone 10 mg tablets: 5 tablets-2 days, 4 tablets for 3 days, 3 tablets for 3 days, 2 tablets for 2 days and 1 tablet for 1 day  Relevant Medications    predniSONE 10 mg tablet    Other Relevant Orders    CBC and differential    Comprehensive metabolic panel    Hemoglobin A1C    Lipid Panel with Direct LDL reflex    TSH, 3rd generation    PSA, Total Screen       Other    Hyperglycemia     His fasting sugar remains about the same at 1:17 a m  And his hemoglobin A1c came down from 6 4% to 6 1%  Relevant Orders    CBC and differential    Comprehensive metabolic panel    Hemoglobin A1C    Lipid Panel with Direct LDL reflex    TSH, 3rd generation    PSA, Total Screen    Mixed hyperlipidemia     His triglycerides went up from 181 to 195 because he has been eating a lot of ice cream recently  His LDL is stable at 99  He is currently on atorvastatin 20 mg daily and takes it every day  Relevant Orders    CBC and differential    Comprehensive metabolic panel    Hemoglobin A1C    Lipid Panel with Direct LDL reflex    TSH, 3rd generation    PSA, Total Screen    Screening for prostate cancer     A PSA is ordered for his next visit in 9 months  Relevant Orders    PSA, Total Screen    Erectile dysfunction     Patient requested a prescription for Cialis    He wanted printed out which was done for him  Relevant Medications    tadalafil (CIALIS) 10 MG tablet                 Diagnoses and all orders for this visit:    Primary osteoarthritis of right foot  -     predniSONE 10 mg tablet; Five tablets for 2 days, 4 tablets for 3 days, 3 tablets for 3 days, 2 tablets for 2 days, 1 tablet for 1 day  -     CBC and differential; Future  -     Comprehensive metabolic panel; Future  -     Hemoglobin A1C; Future  -     Lipid Panel with Direct LDL reflex; Future  -     TSH, 3rd generation; Future  -     PSA, Total Screen; Future    Mixed hyperlipidemia  -     CBC and differential; Future  -     Comprehensive metabolic panel; Future  -     Hemoglobin A1C; Future  -     Lipid Panel with Direct LDL reflex; Future  -     TSH, 3rd generation; Future  -     PSA, Total Screen; Future    Hyperglycemia  -     CBC and differential; Future  -     Comprehensive metabolic panel; Future  -     Hemoglobin A1C; Future  -     Lipid Panel with Direct LDL reflex; Future  -     TSH, 3rd generation; Future  -     PSA, Total Screen; Future    Essential hypertension  -     CBC and differential; Future  -     Comprehensive metabolic panel; Future  -     Hemoglobin A1C; Future  -     Lipid Panel with Direct LDL reflex; Future  -     TSH, 3rd generation; Future  -     PSA, Total Screen; Future    Screening for prostate cancer  -     PSA, Total Screen; Future    Erectile dysfunction, unspecified erectile dysfunction type  -     tadalafil (CIALIS) 10 MG tablet; Take 1 tablet (10 mg total) by mouth daily as needed for erectile dysfunction              Subjective:      Patient ID: Bg Gavin is a 59 y o  male  CC:    No chief complaint on file  HPI:    This is a 77-year-old male who comes in for his regular 6 month visit  He has several problems going on  His 1st problem is the base of the right toe is arthritic and he was given some Voltaren gel which did not help    He is requesting some prednisone in a tapering dose and a prescription will be given to him  He declines an x-ray because his insurance deductible is extremely high and right now he has to pay for the x-ray  He is requesting a print double prescription for Cialis  His other problem was a dry cough which has been occurring off and on since November  He does have some postnasal drip and Coricidin HBP/cold and flu will be prescribed  His blood pressure is 110/76 and his weight is up 3 lb from the previous visit to 193 lb  His BMI is 28 1  Reviewing his labs, his hemoglobin A1c came down from 6 4% to 6 1%, his glucose remained about the same at 117 and last time it was 118, triglycerides are up from 181 to 194 because he has been eating a lot of ice cream   His LDL is stable at 99-his last LDL was also in 99  He had a colonoscopy done by Dr Ruthie Canseco in December of 2016  No polyps were found and a repeat colonoscopy is due in 10 years  The following portions of the patient's history were reviewed and updated as appropriate: allergies, current medications, past family history, past medical history, past social history, past surgical history and problem list       Review of Systems   Constitutional: Negative  Negative for chills, fatigue and fever  HENT: Positive for postnasal drip and sore throat  Negative for congestion, ear discharge, rhinorrhea and sinus pressure  Eyes: Negative  Respiratory: Negative  Cardiovascular: Negative  Gastrointestinal: Negative  Endocrine: Negative  Genitourinary: Negative  Musculoskeletal: Negative  Tender to palpation base of right great toe with good range of motion  Skin: Negative  Allergic/Immunologic: Negative  Neurological: Negative  Hematological: Negative  Psychiatric/Behavioral: Negative            Data to review:       Objective:    Vitals:    02/17/20 1227   BP: 110/76   BP Location: Left arm   Patient Position: Sitting   Cuff Size: Large   Pulse: 88   Weight: 88 9 kg (196 lb)   Height: 5' 10" (1 778 m)        Physical Exam   Constitutional: He is oriented to person, place, and time  He appears well-developed and well-nourished  HENT:   Head: Normocephalic  Right Ear: External ear normal    Left Ear: External ear normal    Mouth/Throat: No oropharyngeal exudate  Positive postnasal drip clear in color with +1 erythema of posterior pharynx and no exudates  Eyes: Pupils are equal, round, and reactive to light  Conjunctivae and EOM are normal    Neck: Normal range of motion  Neck supple  Cardiovascular: Normal rate, regular rhythm and normal heart sounds  Pulmonary/Chest: Effort normal and breath sounds normal  He has no wheezes  He has no rales  Abdominal: Soft  Bowel sounds are normal    Musculoskeletal: Normal range of motion  Lymphadenopathy:     He has no cervical adenopathy  Neurological: He is alert and oriented to person, place, and time  Skin: Skin is warm  Psychiatric: He has a normal mood and affect  His behavior is normal  Judgment and thought content normal    Nursing note and vitals reviewed  BMI Counseling: Body mass index is 28 12 kg/m²  The BMI is above normal  Nutrition recommendations include decreasing portion sizes, encouraging healthy choices of fruits and vegetables, decreasing fast food intake, consuming healthier snacks, limiting drinks that contain sugar, moderation in carbohydrate intake, increasing intake of lean protein, reducing intake of saturated and trans fat and reducing intake of cholesterol  Exercise recommendations include exercising 3-5 times per week  No pharmacotherapy was ordered

## 2020-04-16 ENCOUNTER — TELEPHONE (OUTPATIENT)
Dept: FAMILY MEDICINE CLINIC | Facility: CLINIC | Age: 65
End: 2020-04-16

## 2020-04-16 DIAGNOSIS — R06.83 SNORING: ICD-10-CM

## 2020-04-16 DIAGNOSIS — I10 ESSENTIAL HYPERTENSION: ICD-10-CM

## 2020-04-16 DIAGNOSIS — E78.2 MIXED HYPERLIPIDEMIA: ICD-10-CM

## 2020-04-17 RX ORDER — ATORVASTATIN CALCIUM 20 MG/1
20 TABLET, FILM COATED ORAL DAILY
Qty: 90 TABLET | Refills: 3 | Status: SHIPPED | OUTPATIENT
Start: 2020-04-17 | End: 2021-01-07 | Stop reason: SDUPTHER

## 2020-04-17 RX ORDER — AMLODIPINE BESYLATE 10 MG/1
10 TABLET ORAL DAILY
Qty: 90 TABLET | Refills: 3 | Status: SHIPPED | OUTPATIENT
Start: 2020-04-17 | End: 2021-01-07 | Stop reason: SDUPTHER

## 2020-04-17 RX ORDER — TAMSULOSIN HYDROCHLORIDE 0.4 MG/1
0.4 CAPSULE ORAL DAILY
Qty: 90 CAPSULE | Refills: 3 | Status: SHIPPED | OUTPATIENT
Start: 2020-04-17 | End: 2021-01-07 | Stop reason: SDUPTHER

## 2020-04-20 DIAGNOSIS — E78.2 MIXED HYPERLIPIDEMIA: ICD-10-CM

## 2020-04-20 DIAGNOSIS — R06.83 SNORING: ICD-10-CM

## 2020-04-20 DIAGNOSIS — I10 ESSENTIAL HYPERTENSION: ICD-10-CM

## 2020-04-20 RX ORDER — LOSARTAN POTASSIUM 25 MG/1
25 TABLET ORAL DAILY
Qty: 90 TABLET | Refills: 3 | Status: CANCELLED | OUTPATIENT
Start: 2020-04-20

## 2020-04-20 RX ORDER — LOSARTAN POTASSIUM 25 MG/1
25 TABLET ORAL DAILY
Qty: 90 TABLET | Refills: 3 | Status: SHIPPED | OUTPATIENT
Start: 2020-04-20 | End: 2020-04-27 | Stop reason: SDUPTHER

## 2020-04-27 DIAGNOSIS — R06.83 SNORING: ICD-10-CM

## 2020-04-27 DIAGNOSIS — I10 ESSENTIAL HYPERTENSION: ICD-10-CM

## 2020-04-27 DIAGNOSIS — E78.2 MIXED HYPERLIPIDEMIA: ICD-10-CM

## 2020-04-27 RX ORDER — LOSARTAN POTASSIUM 25 MG/1
25 TABLET ORAL DAILY
Qty: 90 TABLET | Refills: 3 | Status: SHIPPED | OUTPATIENT
Start: 2020-04-27 | End: 2020-04-29 | Stop reason: SDUPTHER

## 2020-04-29 DIAGNOSIS — E78.2 MIXED HYPERLIPIDEMIA: ICD-10-CM

## 2020-04-29 DIAGNOSIS — R06.83 SNORING: ICD-10-CM

## 2020-04-29 DIAGNOSIS — I10 ESSENTIAL HYPERTENSION: ICD-10-CM

## 2020-04-29 RX ORDER — LOSARTAN POTASSIUM 25 MG/1
25 TABLET ORAL DAILY
Qty: 90 TABLET | Refills: 3 | Status: SHIPPED | OUTPATIENT
Start: 2020-04-29 | End: 2021-01-04 | Stop reason: SDUPTHER

## 2020-09-03 ENCOUNTER — TELEPHONE (OUTPATIENT)
Dept: FAMILY MEDICINE CLINIC | Facility: CLINIC | Age: 65
End: 2020-09-03

## 2020-09-03 DIAGNOSIS — N52.9 ERECTILE DYSFUNCTION, UNSPECIFIED ERECTILE DYSFUNCTION TYPE: ICD-10-CM

## 2020-09-03 RX ORDER — TADALAFIL 20 MG/1
20 TABLET ORAL DAILY PRN
Qty: 10 TABLET | Refills: 5 | Status: SHIPPED | OUTPATIENT
Start: 2020-09-03 | End: 2020-09-04 | Stop reason: SDUPTHER

## 2020-09-03 NOTE — TELEPHONE ENCOUNTER
TS, Pt has an appt with you in Nov  And is requesting to increase his Cialis from 10 mg to 20 mg, May we increase, Pt would like this printed

## 2020-09-04 DIAGNOSIS — N52.9 ERECTILE DYSFUNCTION, UNSPECIFIED ERECTILE DYSFUNCTION TYPE: ICD-10-CM

## 2020-09-04 RX ORDER — TADALAFIL 20 MG/1
20 TABLET ORAL DAILY PRN
Qty: 90 TABLET | Refills: 1 | Status: SHIPPED | OUTPATIENT
Start: 2020-09-04 | End: 2020-09-10 | Stop reason: SDUPTHER

## 2020-09-10 DIAGNOSIS — N52.9 ERECTILE DYSFUNCTION, UNSPECIFIED ERECTILE DYSFUNCTION TYPE: ICD-10-CM

## 2020-09-10 RX ORDER — TADALAFIL 20 MG/1
20 TABLET ORAL DAILY PRN
Qty: 90 TABLET | Refills: 0 | Status: SHIPPED | OUTPATIENT
Start: 2020-09-10 | End: 2020-11-03 | Stop reason: SDUPTHER

## 2020-11-03 DIAGNOSIS — N52.9 ERECTILE DYSFUNCTION, UNSPECIFIED ERECTILE DYSFUNCTION TYPE: ICD-10-CM

## 2020-11-03 RX ORDER — TADALAFIL 20 MG/1
20 TABLET ORAL DAILY PRN
Qty: 90 TABLET | Refills: 0 | Status: SHIPPED | OUTPATIENT
Start: 2020-11-03

## 2020-11-05 DIAGNOSIS — N52.9 ERECTILE DYSFUNCTION, UNSPECIFIED ERECTILE DYSFUNCTION TYPE: ICD-10-CM

## 2020-11-05 RX ORDER — TADALAFIL 20 MG/1
20 TABLET ORAL DAILY PRN
Qty: 90 TABLET | Refills: 0 | Status: CANCELLED | OUTPATIENT
Start: 2020-11-05

## 2020-12-10 ENCOUNTER — TELEPHONE (OUTPATIENT)
Dept: FAMILY MEDICINE CLINIC | Facility: CLINIC | Age: 65
End: 2020-12-10

## 2021-01-04 ENCOUNTER — OFFICE VISIT (OUTPATIENT)
Dept: FAMILY MEDICINE CLINIC | Facility: CLINIC | Age: 66
End: 2021-01-04
Payer: MEDICARE

## 2021-01-04 VITALS
RESPIRATION RATE: 18 BRPM | HEIGHT: 70 IN | TEMPERATURE: 98.2 F | SYSTOLIC BLOOD PRESSURE: 116 MMHG | BODY MASS INDEX: 27.92 KG/M2 | DIASTOLIC BLOOD PRESSURE: 80 MMHG | HEART RATE: 88 BPM | WEIGHT: 195 LBS

## 2021-01-04 DIAGNOSIS — E78.2 MIXED HYPERLIPIDEMIA: ICD-10-CM

## 2021-01-04 DIAGNOSIS — R73.9 HYPERGLYCEMIA: ICD-10-CM

## 2021-01-04 DIAGNOSIS — Z12.5 SCREENING FOR PROSTATE CANCER: ICD-10-CM

## 2021-01-04 DIAGNOSIS — T75.3XXA MOTION SICKNESS, INITIAL ENCOUNTER: ICD-10-CM

## 2021-01-04 DIAGNOSIS — I10 ESSENTIAL HYPERTENSION: Primary | ICD-10-CM

## 2021-01-04 DIAGNOSIS — R42 VERTIGO: ICD-10-CM

## 2021-01-04 DIAGNOSIS — R51.9 NONINTRACTABLE HEADACHE, UNSPECIFIED CHRONICITY PATTERN, UNSPECIFIED HEADACHE TYPE: ICD-10-CM

## 2021-01-04 DIAGNOSIS — R06.83 SNORING: ICD-10-CM

## 2021-01-04 DIAGNOSIS — Z00.00 HEALTH CARE MAINTENANCE: ICD-10-CM

## 2021-01-04 PROCEDURE — 99214 OFFICE O/P EST MOD 30 MIN: CPT | Performed by: PHYSICIAN ASSISTANT

## 2021-01-04 PROCEDURE — G0402 INITIAL PREVENTIVE EXAM: HCPCS | Performed by: PHYSICIAN ASSISTANT

## 2021-01-04 PROCEDURE — 1123F ACP DISCUSS/DSCN MKR DOCD: CPT | Performed by: PHYSICIAN ASSISTANT

## 2021-01-04 RX ORDER — MECLIZINE HYDROCHLORIDE 25 MG/1
25 TABLET ORAL 3 TIMES DAILY PRN
Qty: 30 TABLET | Refills: 2 | Status: SHIPPED | OUTPATIENT
Start: 2021-01-04

## 2021-01-04 RX ORDER — LOSARTAN POTASSIUM 25 MG/1
25 TABLET ORAL DAILY
Qty: 90 TABLET | Refills: 3 | Status: SHIPPED | OUTPATIENT
Start: 2021-01-04 | End: 2021-01-07 | Stop reason: SDUPTHER

## 2021-01-04 RX ORDER — LORAZEPAM 0.5 MG/1
TABLET ORAL
Qty: 4 TABLET | Refills: 0 | Status: SHIPPED | OUTPATIENT
Start: 2021-01-04 | End: 2021-01-15 | Stop reason: ALTCHOICE

## 2021-01-04 NOTE — PROGRESS NOTES
Assessment and Plan:  Patient Instructions   Assessment/plan:  1  Motion sickness-patient has started with new onset motion sickness with driving which has been very severe and incapacitating from driving at times  This just started about 3 months ago but has been persistent since then if not even getting worse in intensity  It did seem to start with an episode of vertigo when he was in the shower but he has not been having any persistent symptoms other than driving at this point  He does seem to get some headache with the symptoms which is fairly significant  He feels that it may be sinus related  I would recommend getting CT of the head to assess sinuses and rule out mass  I would like him to follow-up with otolaryngology for evaluation  I will try prescribing him meclizine in the short term but I did caution him of drowsiness with this medication he should try it at home before his driving with it  Patient verbalizes understanding and agreement with plan  We will also assess full labs  2  Benign essential hypertension-patient is presently stable on losartan 25 mg daily  Medication was renewed  3  Hyperlipidemia-patient has been stable on atorvastatin 20 mg daily, no medication changes  4  Hyperglycemia-patient did have hemoglobin A1c of 6 4 in the past   Recommend reassessing this to rule out onset of diabetes  5  Healthcare maintenance-welcome to Medicare visit completed  See separate note          Problem List Items Addressed This Visit        Cardiovascular and Mediastinum    Essential hypertension - Primary    Relevant Medications    losartan (COZAAR) 25 mg tablet    Other Relevant Orders    CBC and differential    Comprehensive metabolic panel    Hemoglobin A1C    Lipid Panel with Direct LDL reflex    PSA, Total Screen    TSH, 3rd generation with Free T4 reflex       Other    Hyperglycemia    Relevant Orders    CBC and differential    Comprehensive metabolic panel    Hemoglobin A1C    Lipid Panel with Direct LDL reflex    PSA, Total Screen    TSH, 3rd generation with Free T4 reflex    Mixed hyperlipidemia    Relevant Medications    losartan (COZAAR) 25 mg tablet    Other Relevant Orders    CBC and differential    Comprehensive metabolic panel    Hemoglobin A1C    Lipid Panel with Direct LDL reflex    PSA, Total Screen    TSH, 3rd generation with Free T4 reflex    Screening for prostate cancer    Relevant Orders    CBC and differential    Comprehensive metabolic panel    Hemoglobin A1C    Lipid Panel with Direct LDL reflex    PSA, Total Screen    TSH, 3rd generation with Free T4 reflex      Other Visit Diagnoses     Health care maintenance        Snoring        Relevant Medications    losartan (COZAAR) 25 mg tablet    Motion sickness, initial encounter        Relevant Medications    meclizine (ANTIVERT) 25 mg tablet    Other Relevant Orders    Ambulatory Referral to Otolaryngology    CT head wo contrast    Vertigo        Relevant Medications    LORazepam (ATIVAN) 0 5 mg tablet    Other Relevant Orders    CT head wo contrast    Nonintractable headache, unspecified chronicity pattern, unspecified headache type        Relevant Orders    CT head wo contrast                 Diagnoses and all orders for this visit:    Essential hypertension  -     CBC and differential  -     Comprehensive metabolic panel  -     Hemoglobin A1C  -     Lipid Panel with Direct LDL reflex  -     PSA, Total Screen  -     TSH, 3rd generation with Free T4 reflex  -     losartan (COZAAR) 25 mg tablet; Take 1 tablet (25 mg total) by mouth daily    Mixed hyperlipidemia  -     CBC and differential  -     Comprehensive metabolic panel  -     Hemoglobin A1C  -     Lipid Panel with Direct LDL reflex  -     PSA, Total Screen  -     TSH, 3rd generation with Free T4 reflex  -     losartan (COZAAR) 25 mg tablet;  Take 1 tablet (25 mg total) by mouth daily    Hyperglycemia  -     CBC and differential  -     Comprehensive metabolic panel  - Hemoglobin A1C  -     Lipid Panel with Direct LDL reflex  -     PSA, Total Screen  -     TSH, 3rd generation with Free T4 reflex    Screening for prostate cancer  -     CBC and differential  -     Comprehensive metabolic panel  -     Hemoglobin A1C  -     Lipid Panel with Direct LDL reflex  -     PSA, Total Screen  -     TSH, 3rd generation with Free T4 reflex    Health care maintenance    Snoring  -     losartan (COZAAR) 25 mg tablet; Take 1 tablet (25 mg total) by mouth daily    Motion sickness, initial encounter  -     Ambulatory Referral to Otolaryngology; Future  -     CT head wo contrast; Future  -     meclizine (ANTIVERT) 25 mg tablet; Take 1 tablet (25 mg total) by mouth 3 (three) times a day as needed for dizziness    Vertigo  -     CT head wo contrast; Future  -     LORazepam (ATIVAN) 0 5 mg tablet; Take 1-2 tablets 1 hour prior to procedure  Nonintractable headache, unspecified chronicity pattern, unspecified headache type  -     CT head wo contrast; Future              Subjective:      Patient ID: Erna Botello is a 72 y o  male  CC:    Chief Complaint   Patient presents with    Motion sickness     patient complains of motion sickness when he drives    Medication Problem       HPI:    HPI:  This is a 70-year-old gentleman that presents to the office for follow-up of chronic health conditions including hypertension, hyperlipidemia, and hyperglycemia  He is also present for annual Medicare wellness visit  He also has complaints about difficulty with motion sickness when he is driving  Patient states that he started to notice the symptoms about 3 months ago in October  He had an episode shortly after receiving the flu vaccine where he had severe vertigo when closing his eyes in the shower  This only happened once at home and he has not had any problems with vertigo independent of driving since then    However when he is driving he does seem to get very nauseous and get some headache and stomach upset within 10 or 15 minutes of driving  This seems to be getting more intense or started earlier than previously over the past 3 months  He states that as soon as he is done driving the headache seems to resolve  He describes it as feeling like a sinus headache  He does also have a history of hypertension, hyperlipidemia, and increased fasting glucose levels  It has been over a year since he has had blood work completed  He is interested in doing so  His colonoscopy is up-to-date, last having and in 2016 and he was good for 10 years after this  The following portions of the patient's history were reviewed and updated as appropriate: allergies, current medications, past family history, past medical history, past social history, past surgical history and problem list       Review of Systems   Constitutional: Negative for chills, fatigue and fever  HENT: Negative for congestion, ear pain and sinus pressure  Eyes: Negative for visual disturbance  Respiratory: Negative for cough, chest tightness and shortness of breath  Cardiovascular: Negative for chest pain and palpitations  Gastrointestinal: Negative for diarrhea, nausea and vomiting  Endocrine: Negative for polyuria  Genitourinary: Negative for dysuria and frequency  Musculoskeletal: Negative for arthralgias and myalgias  Skin: Negative for pallor and rash  Neurological: Negative for dizziness, weakness, light-headedness, numbness and headaches  Psychiatric/Behavioral: Negative for agitation, behavioral problems and sleep disturbance  All other systems reviewed and are negative          Data to review:       Objective:    Vitals:    01/04/21 1855   BP: 116/80   BP Location: Left arm   Patient Position: Sitting   Cuff Size: Adult   Pulse: 88   Resp: 18   Temp: 98 2 °F (36 8 °C)   TempSrc: Tympanic   Weight: 88 5 kg (195 lb)   Height: 5' 10" (1 778 m)        Physical Exam  Constitutional:       General: He is not in acute distress  Appearance: He is well-developed  HENT:      Head: Normocephalic and atraumatic  Right Ear: Tympanic membrane normal       Left Ear: Tympanic membrane normal    Eyes:      Conjunctiva/sclera: Conjunctivae normal    Neck:      Musculoskeletal: Normal range of motion  Cardiovascular:      Rate and Rhythm: Normal rate and regular rhythm  Pulmonary:      Effort: Pulmonary effort is normal    Abdominal:      General: Abdomen is flat  Bowel sounds are normal  There is no distension  Palpations: Abdomen is soft  There is no mass  Musculoskeletal: Normal range of motion  Skin:     General: Skin is warm  Findings: No rash  Neurological:      Mental Status: He is alert and oriented to person, place, and time  Psychiatric:         Mood and Affect: Mood normal            BMI Counseling: Body mass index is 27 98 kg/m²  The BMI is above normal  Nutrition recommendations include decreasing portion sizes  Exercise recommendations include exercising 3-5 times per week

## 2021-01-05 NOTE — PROGRESS NOTES
Assessment and Plan:   1  Healthcare maintenance-welcome to Medicare visit  Problem List Items Addressed This Visit     None           Preventive health issues were discussed with patient, and age appropriate screening tests were ordered as noted in patient's After Visit Summary  Personalized health advice and appropriate referrals for health education or preventive services given if needed, as noted in patient's After Visit Summary       History of Present Illness:     Patient presents for Medicare Annual Wellness visit    Patient Care Team:  Kiley Garza PA-C as PCP - General (Family Medicine)     Problem List:     Patient Active Problem List   Diagnosis    BPH with obstruction/lower urinary tract symptoms    Neck pain    Erectile dysfunction of non-organic origin    Hyperglycemia    Mixed hyperlipidemia    Essential hypertension    Migraine headache    Pulmonary nodules    Tinnitus    Primary osteoarthritis of right foot    Screen for colon cancer    Screening for prostate cancer    S/P colonoscopy    Erectile dysfunction    Postnasal drip      Past Medical and Surgical History:     Past Medical History:   Diagnosis Date    Alcohol abuse, in remission     Drug dependence, in remission ([facility] [address] )     Head injury     CLOSED HEAD INJURY WITH CONCUSSION     Past Surgical History:   Procedure Laterality Date    COLONOSCOPY      COMPLETE LAST ASSESSED: [id number]    ELBOW SURGERY      LAST ASSESSED: [id number]    NASAL SEPTUM SURGERY      DEVIATION REPAIR LAST ASSESSED: [id number]    TONSILLECTOMY      LAST ASSESSED: [id number]      Family History:     Family History   Problem Relation Age of Onset    Other Mother         HEADACHE    Heart disease Mother     Hypertension Mother     Hyperlipidemia Mother         PURE    Bipolar disorder Daughter         NOS    Other Daughter         HEADACHE    Irritable bowel syndrome Daughter       Social History:        Social History     Socioeconomic History    Marital status: Single     Spouse name: None    Number of children: None    Years of education: None    Highest education level: None   Occupational History    Occupation:      Comment: EMPLOYED   Social Needs    Financial resource strain: None    Food insecurity     Worry: None     Inability: None    Transportation needs     Medical: None     Non-medical: None   Tobacco Use    Smoking status: Former Smoker     Types: Cigars    Smokeless tobacco: Former User    Tobacco comment: NO [address]   Substance and Sexual Activity    Alcohol use: No     Comment: RECOVERING ALCOHOLIC SINCE 5/0243  DAILY ALCOHOL CONSUMPTIO WAS 30 BEERS PER DAY      Drug use: No     Types: Cocaine, "Crack" cocaine, LSD, Marijuana     Comment: FORMERLY ADDICTED TO COCAINE, CRACK COCAINE, FORMERLY USED MARIJUANA REGULALRLY, AND USED LSD    Sexual activity: None   Lifestyle    Physical activity     Days per week: None     Minutes per session: None    Stress: None   Relationships    Social connections     Talks on phone: None     Gets together: None     Attends Congregation service: None     Active member of club or organization: None     Attends meetings of clubs or organizations: None     Relationship status: None    Intimate partner violence     Fear of current or ex partner: None     Emotionally abused: None     Physically abused: None     Forced sexual activity: None   Other Topics Concern    None   Social History Narrative    CAFFEINE USE - CONSUMES ON AVERAGE 3 CUPS OF COFFEE PER DAY     AS PER [employer]TS      Medications and Allergies:     Current Outpatient Medications   Medication Sig Dispense Refill    amLODIPine (NORVASC) 10 mg tablet Take 1 tablet (10 mg total) by mouth daily 90 tablet 3    atorvastatin (LIPITOR) 20 mg tablet Take 1 tablet (20 mg total) by mouth daily 90 tablet 3    Loratadine 10 MG CAPS Take by mouth      losartan (COZAAR) 25 mg tablet Take 1 tablet (25 mg total) by mouth daily 90 tablet 3    Multiple Vitamins-Minerals (ONE-A-DAY 50 PLUS PO) Take by mouth      tadalafil (CIALIS) 20 MG tablet Take 1 tablet (20 mg total) by mouth daily as needed for erectile dysfunction 90 tablet 0    tamsulosin (FLOMAX) 0 4 mg Take 1 capsule (0 4 mg total) by mouth daily 90 capsule 3    SUMAtriptan (IMITREX) 100 mg tablet Take by mouth       No current facility-administered medications for this visit  No Known Allergies   Immunizations:     Immunization History   Administered Date(s) Administered    INFLUENZA 10/12/2020      Health Maintenance:         Topic Date Due    Hepatitis C Screening  1955    HIV Screening  11/13/1970    Colorectal Cancer Screening  12/30/2026         Topic Date Due    Influenza Vaccine (1) 09/01/2020    Pneumococcal Vaccine: 65+ Years (1 of 1 - PPSV23) 11/13/2020      Medicare Health Risk Assessment:     /80 (BP Location: Left arm, Patient Position: Sitting, Cuff Size: Adult)   Pulse 88   Temp 98 2 °F (36 8 °C) (Tympanic)   Resp 18   Ht 5' 10" (1 778 m)   Wt 88 5 kg (195 lb)   BMI 27 98 kg/m²      Tristan Treadwell is here for his Welcome to Medicare visit  Health Risk Assessment:   Patient rates overall health as excellent  Patient feels that their physical health rating is same  Eyesight was rated as same  Hearing was rated as same  Patient feels that their emotional and mental health rating is same  Pain experienced in the last 7 days has been none  Patient states that he has experienced no weight loss or gain in last 6 months  Depression Screening:   PHQ-2 Score: 0      Fall Risk Screening: In the past year, patient has experienced: no history of falling in past year      Home Safety:  Patient does not have trouble with stairs inside or outside of their home  Patient has working smoke alarms and has no working carbon monoxide detector  Home safety hazards include: none       Nutrition:   Current diet is Regular  Medications:   Patient is currently taking over-the-counter supplements  OTC medications include: see medication list  Patient is able to manage medications  Activities of Daily Living (ADLs)/Instrumental Activities of Daily Living (IADLs):   Walk and transfer into and out of bed and chair?: Yes  Dress and groom yourself?: Yes    Bathe or shower yourself?: Yes    Feed yourself?  Yes  Do your laundry/housekeeping?: Yes  Manage your money, pay your bills and track your expenses?: Yes  Make your own meals?: Yes    Do your own shopping?: Yes    Previous Hospitalizations:   Any hospitalizations or ED visits within the last 12 months?: No      Advance Care Planning:   Living will: Yes    Advanced directive: Yes      Cognitive Screening:   Provider or family/friend/caregiver concerned regarding cognition?: No    PREVENTIVE SCREENINGS      Cardiovascular Screening:    General: Screening Not Indicated and History Lipid Disorder      Diabetes Screening:       Due for: Blood Glucose      Colorectal Cancer Screening:     General: Screening Current      Prostate Cancer Screening:      Due for: PSA      Osteoporosis Screening:    General: Screening Not Indicated      Abdominal Aortic Aneurysm (AAA) Screening:    Risk factors include: age between 73-67 yo and tobacco use        Lung Cancer Screening:     General: Screening Not Indicated      Hepatitis C Screening:    General: Patient Declines      Kate Gonzales PA-C

## 2021-01-05 NOTE — PATIENT INSTRUCTIONS
Assessment/plan:  1  Motion sickness-patient has started with new onset motion sickness with driving which has been very severe and incapacitating from driving at times  This just started about 3 months ago but has been persistent since then if not even getting worse in intensity  It did seem to start with an episode of vertigo when he was in the shower but he has not been having any persistent symptoms other than driving at this point  He does seem to get some headache with the symptoms which is fairly significant  He feels that it may be sinus related  I would recommend getting CT of the head to assess sinuses and rule out mass  I would like him to follow-up with otolaryngology for evaluation  I will try prescribing him meclizine in the short term but I did caution him of drowsiness with this medication he should try it at home before his driving with it  Patient verbalizes understanding and agreement with plan  We will also assess full labs  2  Benign essential hypertension-patient is presently stable on losartan 25 mg daily  Medication was renewed  3  Hyperlipidemia-patient has been stable on atorvastatin 20 mg daily, no medication changes  4  Hyperglycemia-patient did have hemoglobin A1c of 6 4 in the past   Recommend reassessing this to rule out onset of diabetes  5  Healthcare maintenance-welcome to Medicare visit completed  See separate note

## 2021-01-07 DIAGNOSIS — E78.2 MIXED HYPERLIPIDEMIA: ICD-10-CM

## 2021-01-07 DIAGNOSIS — I10 ESSENTIAL HYPERTENSION: ICD-10-CM

## 2021-01-07 DIAGNOSIS — R06.83 SNORING: ICD-10-CM

## 2021-01-07 LAB
ALBUMIN SERPL-MCNC: 4.2 G/DL (ref 3.6–5.1)
ALBUMIN/GLOB SERPL: 2 (CALC) (ref 1–2.5)
ALP SERPL-CCNC: 43 U/L (ref 35–144)
ALT SERPL-CCNC: 25 U/L (ref 9–46)
AST SERPL-CCNC: 22 U/L (ref 10–35)
BASOPHILS # BLD AUTO: 31 CELLS/UL (ref 0–200)
BASOPHILS NFR BLD AUTO: 0.6 %
BILIRUB SERPL-MCNC: 0.7 MG/DL (ref 0.2–1.2)
BUN SERPL-MCNC: 21 MG/DL (ref 7–25)
BUN/CREAT SERPL: ABNORMAL (CALC) (ref 6–22)
CALCIUM SERPL-MCNC: 9.1 MG/DL (ref 8.6–10.3)
CHLORIDE SERPL-SCNC: 107 MMOL/L (ref 98–110)
CHOLEST SERPL-MCNC: 170 MG/DL
CHOLEST/HDLC SERPL: 3.9 (CALC)
CO2 SERPL-SCNC: 27 MMOL/L (ref 20–32)
CREAT SERPL-MCNC: 1.19 MG/DL (ref 0.7–1.25)
EOSINOPHIL # BLD AUTO: 117 CELLS/UL (ref 15–500)
EOSINOPHIL NFR BLD AUTO: 2.3 %
ERYTHROCYTE [DISTWIDTH] IN BLOOD BY AUTOMATED COUNT: 12.8 % (ref 11–15)
GLOBULIN SER CALC-MCNC: 2.1 G/DL (CALC) (ref 1.9–3.7)
GLUCOSE SERPL-MCNC: 122 MG/DL (ref 65–99)
HBA1C MFR BLD: 6 % OF TOTAL HGB
HCT VFR BLD AUTO: 46.4 % (ref 38.5–50)
HDLC SERPL-MCNC: 44 MG/DL
HGB BLD-MCNC: 15.3 G/DL (ref 13.2–17.1)
LDLC SERPL CALC-MCNC: 105 MG/DL (CALC)
LYMPHOCYTES # BLD AUTO: 2096 CELLS/UL (ref 850–3900)
LYMPHOCYTES NFR BLD AUTO: 41.1 %
MCH RBC QN AUTO: 30.9 PG (ref 27–33)
MCHC RBC AUTO-ENTMCNC: 33 G/DL (ref 32–36)
MCV RBC AUTO: 93.7 FL (ref 80–100)
MONOCYTES # BLD AUTO: 576 CELLS/UL (ref 200–950)
MONOCYTES NFR BLD AUTO: 11.3 %
NEUTROPHILS # BLD AUTO: 2280 CELLS/UL (ref 1500–7800)
NEUTROPHILS NFR BLD AUTO: 44.7 %
NONHDLC SERPL-MCNC: 126 MG/DL (CALC)
PLATELET # BLD AUTO: 138 THOUSAND/UL (ref 140–400)
PMV BLD REES-ECKER: 9.6 FL (ref 7.5–12.5)
POTASSIUM SERPL-SCNC: 4.2 MMOL/L (ref 3.5–5.3)
PROT SERPL-MCNC: 6.3 G/DL (ref 6.1–8.1)
PSA SERPL-MCNC: 0.5 NG/ML
RBC # BLD AUTO: 4.95 MILLION/UL (ref 4.2–5.8)
SL AMB EGFR AFRICAN AMERICAN: 74 ML/MIN/1.73M2
SL AMB EGFR NON AFRICAN AMERICAN: 64 ML/MIN/1.73M2
SODIUM SERPL-SCNC: 141 MMOL/L (ref 135–146)
TRIGL SERPL-MCNC: 115 MG/DL
TSH SERPL-ACNC: 2.46 MIU/L (ref 0.4–4.5)
WBC # BLD AUTO: 5.1 THOUSAND/UL (ref 3.8–10.8)

## 2021-01-07 RX ORDER — AMLODIPINE BESYLATE 10 MG/1
10 TABLET ORAL DAILY
Qty: 90 TABLET | Refills: 3 | Status: SHIPPED | OUTPATIENT
Start: 2021-01-07

## 2021-01-07 RX ORDER — ATORVASTATIN CALCIUM 20 MG/1
20 TABLET, FILM COATED ORAL DAILY
Qty: 90 TABLET | Refills: 3 | Status: SHIPPED | OUTPATIENT
Start: 2021-01-07 | End: 2022-01-03

## 2021-01-07 RX ORDER — LOSARTAN POTASSIUM 25 MG/1
25 TABLET ORAL DAILY
Qty: 90 TABLET | Refills: 3 | Status: SHIPPED | OUTPATIENT
Start: 2021-01-07 | End: 2021-01-12 | Stop reason: SDUPTHER

## 2021-01-07 RX ORDER — TAMSULOSIN HYDROCHLORIDE 0.4 MG/1
0.4 CAPSULE ORAL DAILY
Qty: 90 CAPSULE | Refills: 3 | Status: SHIPPED | OUTPATIENT
Start: 2021-01-07 | End: 2022-01-03

## 2021-01-11 ENCOUNTER — HOSPITAL ENCOUNTER (OUTPATIENT)
Dept: CT IMAGING | Facility: HOSPITAL | Age: 66
Discharge: HOME | End: 2021-01-11
Payer: MEDICARE

## 2021-01-11 DIAGNOSIS — R42 VERTIGO: ICD-10-CM

## 2021-01-11 DIAGNOSIS — T75.3XXA MOTION SICKNESS, INITIAL ENCOUNTER: ICD-10-CM

## 2021-01-11 DIAGNOSIS — R51.9 NONINTRACTABLE HEADACHE, UNSPECIFIED CHRONICITY PATTERN, UNSPECIFIED HEADACHE TYPE: ICD-10-CM

## 2021-01-11 PROCEDURE — 70450 CT HEAD/BRAIN W/O DYE: CPT

## 2021-01-11 PROCEDURE — G1004 CDSM NDSC: HCPCS

## 2021-01-12 ENCOUNTER — TELEPHONE (OUTPATIENT)
Dept: FAMILY MEDICINE CLINIC | Facility: CLINIC | Age: 66
End: 2021-01-12

## 2021-01-12 DIAGNOSIS — J32.2 CHRONIC ETHMOIDAL SINUSITIS: Primary | ICD-10-CM

## 2021-01-12 DIAGNOSIS — E78.2 MIXED HYPERLIPIDEMIA: ICD-10-CM

## 2021-01-12 DIAGNOSIS — T75.3XXA MOTION SICKNESS, INITIAL ENCOUNTER: ICD-10-CM

## 2021-01-12 DIAGNOSIS — I10 ESSENTIAL HYPERTENSION: ICD-10-CM

## 2021-01-12 DIAGNOSIS — R06.83 SNORING: ICD-10-CM

## 2021-01-12 RX ORDER — LOSARTAN POTASSIUM 25 MG/1
25 TABLET ORAL DAILY
Qty: 14 TABLET | Refills: 0 | Status: SHIPPED | OUTPATIENT
Start: 2021-01-12 | End: 2021-02-15

## 2021-01-12 RX ORDER — SULFAMETHOXAZOLE AND TRIMETHOPRIM 800; 160 MG/1; MG/1
1 TABLET ORAL EVERY 12 HOURS SCHEDULED
Qty: 40 TABLET | Refills: 0 | Status: SHIPPED | OUTPATIENT
Start: 2021-01-12 | End: 2021-02-01

## 2021-01-12 NOTE — TELEPHONE ENCOUNTER
Patient does have significant sinus disease evident on CT of the head  There is no other abnormalities    I would recommend starting a long course of antibiotics and scheduling follow-up with otolaryngologist

## 2021-02-09 ENCOUNTER — TRANSCRIBE ORDERS (OUTPATIENT)
Dept: ADMINISTRATIVE | Facility: HOSPITAL | Age: 66
End: 2021-02-09

## 2021-02-09 DIAGNOSIS — N40.1 BENIGN PROSTATIC HYPERPLASIA WITH LOWER URINARY TRACT SYMPTOMS: Primary | ICD-10-CM

## 2021-02-09 DIAGNOSIS — R91.1 SOLITARY PULMONARY NODULE: Primary | ICD-10-CM

## 2021-02-12 DIAGNOSIS — R06.83 SNORING: ICD-10-CM

## 2021-02-12 DIAGNOSIS — I10 ESSENTIAL HYPERTENSION: ICD-10-CM

## 2021-02-12 DIAGNOSIS — E78.2 MIXED HYPERLIPIDEMIA: ICD-10-CM

## 2021-02-15 RX ORDER — LOSARTAN POTASSIUM 25 MG/1
TABLET ORAL
Qty: 90 TABLET | Refills: 0 | Status: SHIPPED | OUTPATIENT
Start: 2021-02-15

## 2021-02-25 ENCOUNTER — HOSPITAL ENCOUNTER (OUTPATIENT)
Dept: ULTRASOUND IMAGING | Facility: HOSPITAL | Age: 66
Discharge: HOME | End: 2021-02-25
Payer: MEDICARE

## 2021-02-25 DIAGNOSIS — N40.1 BENIGN PROSTATIC HYPERPLASIA WITH LOWER URINARY TRACT SYMPTOMS: ICD-10-CM

## 2021-02-25 PROCEDURE — 51798 US URINE CAPACITY MEASURE: CPT

## 2021-03-04 ENCOUNTER — TELEPHONE (OUTPATIENT)
Dept: UROLOGY | Facility: MEDICAL CENTER | Age: 66
End: 2021-03-04

## 2021-03-04 NOTE — TELEPHONE ENCOUNTER
Called pt, appt made 4/22/21 with Dr Cale Quarles  Offered him a sooner appt but he is a  and can only schedule at certain times

## 2021-03-04 NOTE — TELEPHONE ENCOUNTER
Please Triage -  [facility]  New Patient-     What is the reason for the patients appointment? Darrian Gayle from Dr Mil Lam office called in regards to setting up appointment for Enlarged Prostate  Erectile Dysfunction, Nocturia and frequency during the day as well  Patient can be contacted directly for appointment  Imaging/Lab Results:U/S in epic       Do we accept the patient's insurance or is the patient Self-Pay? Medicare   Provider & Plan:   Member ID#:       Has the patient had any previous urologist(s)?no        Have patient records been requested?epic        Has the patient had any outside testing done?u/s in [facility]      Does the patient have a personal history of cancer?no       Patient can be reached directly at :[phone]

## 2021-03-10 DIAGNOSIS — Z23 ENCOUNTER FOR IMMUNIZATION: ICD-10-CM

## 2021-04-09 ENCOUNTER — HOSPITAL ENCOUNTER (OUTPATIENT)
Dept: CT IMAGING | Facility: HOSPITAL | Age: 66
Discharge: HOME | End: 2021-04-09
Payer: MEDICARE

## 2021-04-09 DIAGNOSIS — R91.1 SOLITARY PULMONARY NODULE: ICD-10-CM

## 2021-04-09 PROCEDURE — 71250 CT THORAX DX C-: CPT

## 2021-04-15 ENCOUNTER — TRANSCRIBE ORDERS (OUTPATIENT)
Dept: ADMINISTRATIVE | Facility: HOSPITAL | Age: 66
End: 2021-04-15

## 2021-04-15 DIAGNOSIS — I10 ESSENTIAL (PRIMARY) HYPERTENSION: ICD-10-CM

## 2021-04-15 DIAGNOSIS — R94.31 ABNORMAL EKG: Primary | ICD-10-CM

## 2021-04-15 DIAGNOSIS — R06.02 SOB (SHORTNESS OF BREATH): ICD-10-CM

## 2021-04-16 ENCOUNTER — OFFICE VISIT (OUTPATIENT)
Dept: PULMONOLOGY | Facility: CLINIC | Age: 66
End: 2021-04-16
Payer: MEDICARE

## 2021-04-16 VITALS
DIASTOLIC BLOOD PRESSURE: 80 MMHG | TEMPERATURE: 97.5 F | WEIGHT: 195.2 LBS | SYSTOLIC BLOOD PRESSURE: 120 MMHG | HEART RATE: 90 BPM | OXYGEN SATURATION: 96 % | BODY MASS INDEX: 27.94 KG/M2 | HEIGHT: 70 IN | RESPIRATION RATE: 18 BRPM

## 2021-04-16 DIAGNOSIS — Z86.16 HISTORY OF COVID-19: ICD-10-CM

## 2021-04-16 DIAGNOSIS — R93.89 ABNORMAL CT OF THE CHEST: ICD-10-CM

## 2021-04-16 DIAGNOSIS — R91.8 PULMONARY NODULES: Primary | ICD-10-CM

## 2021-04-16 DIAGNOSIS — R06.02 SHORTNESS OF BREATH: ICD-10-CM

## 2021-04-16 PROCEDURE — 99214 OFFICE O/P EST MOD 30 MIN: CPT | Performed by: INTERNAL MEDICINE

## 2021-04-16 RX ORDER — FLUTICASONE PROPIONATE 50 MCG
2 SPRAY, SUSPENSION (ML) NASAL DAILY
COMMUNITY
Start: 2021-02-09

## 2021-04-16 RX ORDER — LORAZEPAM 0.5 MG/1
TABLET ORAL
COMMUNITY
Start: 2021-04-05

## 2021-04-16 NOTE — PROGRESS NOTES
Pulmonary Follow up  Lucie Le 72 y o  male MRN: [id number]  4/16/2021      Assessment:    1  Pulmonary nodules  - 2 small subcentimeter pulmonary nodules in the lingula and LLL  - The nodule in the lingula appears slightly more solid than the imaging in 2018 although this may be due to differences in slices  - He does have a significant tobacco exposure  - Will obtain CT chest w/o contrast in 6 months    2  Shortness of breath  - Primarily with exertion  The SOB is overall much improved since he first noticed it in 2/2021  He can now walk nearly 1 mile per day  - He does have GGO on the lungs and this could represent improving prior COVID opacities  - Will repeat CT chest in 6 months  - If the GGO remain or his symptoms persist after repeat imaging, will check PFTs    3  History of COVID-19  - Reports testing positive in 02/2021  - He is recovering now with improvement of SOB  - He did not require hospitalization or treatment during that time  - Recently received the [employer] vaccine on 3/26/2021    4  Abnormal CT of the chest  - GGO scattered bilaterally  - Could represent improvement in COVID PNA vs air trapping  - Will repeat CT chest in 6 months    Plan:    Diagnoses and all orders for this visit:    Pulmonary nodules  -     CT chest without contrast; Future    Shortness of breath    History of COVID-19  -     CT chest without contrast; Future    Abnormal CT of the chest  -     CT chest without contrast; Future    Other orders  -     fluticasone (KLS Aller-Danny) 50 mcg/act nasal spray; 2 sprays into each nostril Daily  -     LORazepam (Ativan) 0 5 mg tablet; take 1 tablet by oral route 1/2 hour prior to CT scan      History of Present Illness   HPI:  Lucie Le is a 72 y o  male who presents for follow up of pulmonary nodules and SOB  The patient had a CT chest in 2018 that showed two subcentimeter pulmonary nodules   He underwent a recent CT of the chest and wanted to follow up regarding any progression  In addition, he reports that in 02/2021 he was diagnosed with covid and at that time felt as though he could not breath at all even when ambulating just a couple of steps  He did not require hospitalization and did not take any medications for it  Since then his SOB has improved significantly and he now walks nearly 1 mile per day and feels shortness of breath at about a half a mile  He reports having his COVID vaccine on 03/26/2021    He denies any chest pain, cough, dyspnea, or chest tightness  He has a hx of smoking cigars/chewing tobacco and alcohol abuse but quit everything in the year 2000  His mother had emphysema and was a smoker  Review of Systems   Constitutional: Negative for chills, diaphoresis, fatigue and fever  HENT: Negative for congestion, ear pain, postnasal drip, rhinorrhea, sneezing, trouble swallowing and voice change  Eyes: Negative for redness and itching  Respiratory: Positive for shortness of breath  Negative for apnea, cough, choking, chest tightness, wheezing and stridor  Cardiovascular: Negative for chest pain, palpitations and leg swelling  Gastrointestinal: Negative for abdominal distention, abdominal pain, blood in stool, constipation, diarrhea, nausea and vomiting  Endocrine: Negative for polydipsia and polyuria  Genitourinary: Negative for dysuria and flank pain  Musculoskeletal: Negative for arthralgias, back pain and joint swelling  Skin: Negative for pallor and rash  Neurological: Negative for dizziness, syncope, weakness, light-headedness, numbness and headaches  Hematological: Negative for adenopathy  Psychiatric/Behavioral: Negative for agitation         Historical Information   Past Medical History:   Diagnosis Date    Alcohol abuse, in remission     Drug dependence, in remission ([facility] [address] )     Head injury     CLOSED HEAD INJURY WITH CONCUSSION    Lung nodule      Past Surgical History:   Procedure Laterality Date    COLONOSCOPY      COMPLETE LAST ASSESSED: [id number]    ELBOW SURGERY      LAST ASSESSED: [id number]    NASAL SEPTUM SURGERY      DEVIATION REPAIR LAST ASSESSED: [id number]    SINUS SURGERY      TONSILLECTOMY      LAST ASSESSED: [id number]     Family History   Problem Relation Age of Onset    Other Mother         HEADACHE    Heart disease Mother     Hypertension Mother     Hyperlipidemia Mother         PURE    Bipolar disorder Daughter         NOS    Other Daughter         HEADACHE    Irritable bowel syndrome Daughter        Occupational History: used to be a  and worked with iron, he is now a schoolbus     Social History: used to be alcohol dependent and smoked cigars/chewed tobacco but quit everything in 2000    Meds/Allergies     Current Outpatient Medications:     amLODIPine (NORVASC) 10 mg tablet, Take 1 tablet (10 mg total) by mouth daily, Disp: 90 tablet, Rfl: 3    atorvastatin (LIPITOR) 20 mg tablet, Take 1 tablet (20 mg total) by mouth daily, Disp: 90 tablet, Rfl: 3    fluticasone (KLS Aller-Danny) 50 mcg/act nasal spray, 2 sprays into each nostril Daily, Disp: , Rfl:     losartan (COZAAR) 25 mg tablet, TAKE 1 TABLET DAILY, Disp: 90 tablet, Rfl: 0    meclizine (ANTIVERT) 25 mg tablet, Take 1 tablet (25 mg total) by mouth 3 (three) times a day as needed for dizziness, Disp: 30 tablet, Rfl: 2    montelukast (SINGULAIR) 10 mg tablet, Take 1 tablet (10 mg total) by mouth daily at bedtime, Disp: 30 tablet, Rfl: 5    Multiple Vitamins-Minerals (ONE-A-DAY 50 PLUS PO), Take by mouth, Disp: , Rfl:     SUMAtriptan (IMITREX) 100 mg tablet, Take by mouth, Disp: , Rfl:     tadalafil (CIALIS) 20 MG tablet, Take 1 tablet (20 mg total) by mouth daily as needed for erectile dysfunction, Disp: 90 tablet, Rfl: 0    tamsulosin (FLOMAX) 0 4 mg, Take 1 capsule (0 4 mg total) by mouth daily, Disp: 90 capsule, Rfl: 3    Loratadine 10 MG CAPS, Take by mouth, Disp: , Rfl:     LORazepam (Ativan) 0 5 mg tablet, take 1 tablet by oral route 1/2 hour prior to CT scan, Disp: , Rfl:   No Known Allergies    Vitals: Blood pressure 120/80, pulse 90, temperature 97 5 °F (36 4 °C), temperature source Tympanic, resp  rate 18, height 5' 10" (1 778 m), weight 88 5 kg (195 lb 3 2 oz), SpO2 96 %  , Body mass index is 28 01 kg/m²  Oxygen Therapy  SpO2: 96 %  Oxygen Therapy: None (Room air)    Physical Exam  Physical Exam  Constitutional:       General: He is not in acute distress  Appearance: He is not diaphoretic  HENT:      Head: Normocephalic and atraumatic  Nose: Nose normal       Mouth/Throat:      Pharynx: No oropharyngeal exudate  Eyes:      General: No scleral icterus  Conjunctiva/sclera: Conjunctivae normal       Pupils: Pupils are equal, round, and reactive to light  Neck:      Musculoskeletal: Normal range of motion and neck supple  Thyroid: No thyromegaly  Vascular: No JVD  Trachea: No tracheal deviation  Cardiovascular:      Rate and Rhythm: Normal rate and regular rhythm  Heart sounds: Normal heart sounds  No murmur  No friction rub  No gallop  Pulmonary:      Effort: Pulmonary effort is normal  No respiratory distress  Breath sounds: Normal breath sounds  No stridor  No wheezing or rales  Abdominal:      General: Bowel sounds are normal  There is no distension  Palpations: Abdomen is soft  Tenderness: There is no abdominal tenderness  There is no guarding or rebound  Musculoskeletal: Normal range of motion  General: No deformity  Lymphadenopathy:      Cervical: No cervical adenopathy  Skin:     General: Skin is warm  Findings: No erythema or rash  Neurological:      Mental Status: He is alert and oriented to person, place, and time  Cranial Nerves: No cranial nerve deficit  Sensory: No sensory deficit  Labs: I have personally reviewed pertinent lab results    Lab Results   Component Value Date    WBC 5 1 01/06/2021    HGB 15 3 01/06/2021    HCT 46 4 01/06/2021    MCV 93 7 01/06/2021     (L) 01/06/2021     Lab Results   Component Value Date    CALCIUM 9 1 01/06/2021     11/14/2017    K 4 2 01/06/2021    CO2 27 01/06/2021     01/06/2021    BUN 21 01/06/2021    CREATININE 1 19 01/06/2021     No results found for: IGE  Lab Results   Component Value Date    ALT 25 01/06/2021    AST 22 01/06/2021    ALKPHOS 43 01/06/2021    BILITOT 0 5 11/14/2017         Imaging and other studies: I have personally reviewed pertinent reports  and I have personally reviewed pertinent films in PACS  Ct chest- bilateral GGO with 2 subcentimeter pulmonary nodules in the AN and LLL    Pulmonary function testing: none on file    EKG, Pathology, and Other Studies: I have personally reviewed pertinent reports        Edmund Muñoz MD  Pulmonary and Critical Care Fellow- PGY 5  [facility]

## 2021-04-22 ENCOUNTER — OFFICE VISIT (OUTPATIENT)
Dept: UROLOGY | Facility: MEDICAL CENTER | Age: 66
End: 2021-04-22
Payer: MEDICARE

## 2021-04-22 VITALS — HEIGHT: 70 IN | WEIGHT: 191 LBS | BODY MASS INDEX: 27.35 KG/M2

## 2021-04-22 DIAGNOSIS — N13.8 BPH WITH URINARY OBSTRUCTION: Primary | ICD-10-CM

## 2021-04-22 DIAGNOSIS — R39.15 URGENCY OF URINATION: ICD-10-CM

## 2021-04-22 DIAGNOSIS — N40.1 BPH WITH URINARY OBSTRUCTION: Primary | ICD-10-CM

## 2021-04-22 LAB
POST-VOID RESIDUAL VOLUME, ML POC: 83 ML
SL AMB  POCT GLUCOSE, UA: NORMAL
SL AMB LEUKOCYTE ESTERASE,UA: NORMAL
SL AMB POCT BILIRUBIN,UA: NORMAL
SL AMB POCT BLOOD,UA: NORMAL
SL AMB POCT CLARITY,UA: CLEAR
SL AMB POCT COLOR,UA: YELLOW
SL AMB POCT KETONES,UA: NORMAL
SL AMB POCT NITRITE,UA: NORMAL
SL AMB POCT PH,UA: 7.5
SL AMB POCT SPECIFIC GRAVITY,UA: 1.02
SL AMB POCT URINE PROTEIN: NORMAL
SL AMB POCT UROBILINOGEN: 0.2

## 2021-04-22 PROCEDURE — 81003 URINALYSIS AUTO W/O SCOPE: CPT | Performed by: UROLOGY

## 2021-04-22 PROCEDURE — 51798 US URINE CAPACITY MEASURE: CPT | Performed by: UROLOGY

## 2021-04-22 PROCEDURE — 99204 OFFICE O/P NEW MOD 45 MIN: CPT | Performed by: UROLOGY

## 2021-04-22 RX ORDER — FAMOTIDINE 20 MG/1
TABLET, FILM COATED ORAL
COMMUNITY
Start: 2021-04-16

## 2021-04-22 RX ORDER — OXYBUTYNIN CHLORIDE 10 MG/1
10 TABLET, EXTENDED RELEASE ORAL
Qty: 90 TABLET | Refills: 3 | Status: SHIPPED | OUTPATIENT
Start: 2021-04-22 | End: 2021-04-23 | Stop reason: SDUPTHER

## 2021-04-22 NOTE — PROGRESS NOTES
HISTORY:    BPH with minimal response to tamsulosin  Somewhat slower flow, but mainly bothered by frequency, urgency, nocturia times 3-6  No hematuria infections stones    Takes Cialis for ED  Also mentions that his girlfriend has more interest than he does, and he wants to take some extra testosterone  Level has not been checked yet    PSA 0 5 in January 2021         ASSESSMENT / PLAN:    1  He has lab slip from PCP to get testosterone checked and we will wait for that  2  He has mainly overactive bladder symptoms related to his BPH  Emptying well with minimal PVR today  Will add oxybutynin 10 mg daily to his tamsulosin  3  Cysto to further evaluate    The following portions of the patient's history were reviewed and updated as appropriate: allergies, current medications, past family history, past medical history, past social history, past surgical history and problem list     Review of Systems   All other systems reviewed and are negative  Objective:     Physical Exam  Constitutional:       General: He is not in acute distress  Appearance: He is well-developed  He is not diaphoretic  HENT:      Head: Normocephalic and atraumatic  Eyes:      General: No scleral icterus  Pulmonary:      Effort: Pulmonary effort is normal    Genitourinary:     Comments: Penis testes normal    Prostate minimally enlarged no nodules  Skin:     Coloration: Skin is not pale  Neurological:      Mental Status: He is alert and oriented to person, place, and time  Psychiatric:         Behavior: Behavior normal          Thought Content:  Thought content normal          Judgment: Judgment normal            0   Lab Value Date/Time    PSA 0 5 01/06/2021 0808    PSA 0 6 06/19/2019 0743    PSA 0 6 06/02/2018 1006   ]  BUN   Date Value Ref Range Status   01/06/2021 21 7 - 25 mg/dL Final     Creatinine   Date Value Ref Range Status   01/06/2021 1 19 0 70 - 1 25 mg/dL Final     Comment:     For patients >52years of age, the reference limit  for Creatinine is approximately 13% higher for people  identified as -American         11/14/2017 1 12 0 70 - 1 25 mg/dL Final     Comment:     Result Comment: For patients >52years of age, the reference limit  for Creatinine is approximately 13% higher for people  identified as -American  No components found for: CBC      Patient Active Problem List   Diagnosis    BPH with obstruction/lower urinary tract symptoms    Neck pain    Erectile dysfunction of non-organic origin    Hyperglycemia    Mixed hyperlipidemia    Essential hypertension    Migraine headache    Pulmonary nodules    Tinnitus    Primary osteoarthritis of right foot    Screen for colon cancer    Screening for prostate cancer    S/P colonoscopy    Erectile dysfunction    Postnasal drip        Diagnoses and all orders for this visit:    BPH with urinary obstruction  -     POCT urine dip auto non-scope  -     POCT Measure PVR    Urgency of urination  -     oxybutynin (DITROPAN-XL) 10 MG 24 hr tablet; Take 1 tablet (10 mg total) by mouth daily at bedtime    Other orders  -     famotidine (PEPCID) 20 mg tablet           Patient ID: Kellie Barrios is a 72 y o  male        Current Outpatient Medications:     amLODIPine (NORVASC) 10 mg tablet, Take 1 tablet (10 mg total) by mouth daily, Disp: 90 tablet, Rfl: 3    atorvastatin (LIPITOR) 20 mg tablet, Take 1 tablet (20 mg total) by mouth daily, Disp: 90 tablet, Rfl: 3    Loratadine 10 MG CAPS, Take by mouth, Disp: , Rfl:     LORazepam (Ativan) 0 5 mg tablet, take 1 tablet by oral route 1/2 hour prior to CT scan, Disp: , Rfl:     losartan (COZAAR) 25 mg tablet, TAKE 1 TABLET DAILY, Disp: 90 tablet, Rfl: 0    meclizine (ANTIVERT) 25 mg tablet, Take 1 tablet (25 mg total) by mouth 3 (three) times a day as needed for dizziness, Disp: 30 tablet, Rfl: 2    montelukast (SINGULAIR) 10 mg tablet, Take 1 tablet (10 mg total) by mouth daily at bedtime, Disp: 30 tablet, Rfl: 5    Multiple Vitamins-Minerals (ONE-A-DAY 50 PLUS PO), Take by mouth, Disp: , Rfl:     SUMAtriptan (IMITREX) 100 mg tablet, Take by mouth, Disp: , Rfl:     tadalafil (CIALIS) 20 MG tablet, Take 1 tablet (20 mg total) by mouth daily as needed for erectile dysfunction, Disp: 90 tablet, Rfl: 0    tamsulosin (FLOMAX) 0 4 mg, Take 1 capsule (0 4 mg total) by mouth daily, Disp: 90 capsule, Rfl: 3    famotidine (PEPCID) 20 mg tablet, , Disp: , Rfl:     fluticasone (KLS Aller-Danny) 50 mcg/act nasal spray, 2 sprays into each nostril Daily, Disp: , Rfl:     Past Medical History:   Diagnosis Date    Alcohol abuse, in remission     Drug dependence, in remission ([facility] [address]     Head injury     CLOSED HEAD INJURY WITH CONCUSSION    Lung nodule        Past Surgical History:   Procedure Laterality Date    COLONOSCOPY      COMPLETE LAST ASSESSED: [id number]    ELBOW SURGERY      LAST ASSESSED: [id number]    NASAL SEPTUM SURGERY      DEVIATION REPAIR LAST ASSESSED: [id number]    SINUS SURGERY      TONSILLECTOMY      LAST ASSESSED: [id number]       Social History

## 2021-04-23 DIAGNOSIS — R39.15 URGENCY OF URINATION: ICD-10-CM

## 2021-04-23 RX ORDER — OXYBUTYNIN CHLORIDE 10 MG/1
10 TABLET, EXTENDED RELEASE ORAL
Qty: 90 TABLET | Refills: 3 | Status: SHIPPED | OUTPATIENT
Start: 2021-04-23 | End: 2021-12-27

## 2021-04-23 NOTE — TELEPHONE ENCOUNTER
Pt managed by Dr Renteria,pt called to inform office that medication Oxybutyini to costly $130  Also his local pharmacy is [facility]  , pt is  if n/a

## 2021-04-23 NOTE — TELEPHONE ENCOUNTER
Spoke with pt regarding pts appointment with Dr Hernandez Points  Pt was a new pt in our office  Pt is doing well  He has no questions or problems  Pt said, everything was fine in the office

## 2021-04-23 NOTE — TELEPHONE ENCOUNTER
I spoke with the patient regarding cost-effective pricing and various pharmacy vendors  Jone recommended for best cash pay pricing  Cost of #90 count supply of Oxybuynin ER 10mg to Count includes the [facility] is $ 25  Jone coupon was included on the prescription       Script for the requested medication was queued and forwarded to the Advanced Practitioner covering the [facility] location for approval

## 2021-04-29 DIAGNOSIS — E78.2 MIXED HYPERLIPIDEMIA: ICD-10-CM

## 2021-04-29 DIAGNOSIS — I10 ESSENTIAL HYPERTENSION: ICD-10-CM

## 2021-04-29 DIAGNOSIS — R06.83 SNORING: ICD-10-CM

## 2021-04-29 RX ORDER — LOSARTAN POTASSIUM 25 MG/1
TABLET ORAL
Qty: 90 TABLET | Refills: 0 | OUTPATIENT
Start: 2021-04-29

## 2021-05-25 ENCOUNTER — HOSPITAL ENCOUNTER (OUTPATIENT)
Dept: NON INVASIVE DIAGNOSTICS | Facility: HOSPITAL | Age: 66
Discharge: HOME | End: 2021-05-25
Payer: MEDICARE

## 2021-05-25 DIAGNOSIS — R06.02 SOB (SHORTNESS OF BREATH): ICD-10-CM

## 2021-05-25 DIAGNOSIS — R94.31 ABNORMAL EKG: ICD-10-CM

## 2021-05-25 DIAGNOSIS — I10 ESSENTIAL (PRIMARY) HYPERTENSION: ICD-10-CM

## 2021-05-25 LAB
CHEST PAIN STATEMENT: NORMAL
MAX DIASTOLIC BP: 60 MMHG
MAX HEART RATE: 160 BPM
MAX PREDICTED HEART RATE: 155 BPM
MAX. SYSTOLIC BP: 170 MMHG
PROTOCOL NAME: NORMAL
REASON FOR TERMINATION: NORMAL
TARGET HR FORMULA: NORMAL
TEST INDICATION: NORMAL
TIME IN EXERCISE PHASE: NORMAL

## 2021-05-25 PROCEDURE — 93350 STRESS TTE ONLY: CPT

## 2021-05-25 PROCEDURE — 93306 TTE W/DOPPLER COMPLETE: CPT

## 2021-05-25 PROCEDURE — 93306 TTE W/DOPPLER COMPLETE: CPT | Performed by: INTERNAL MEDICINE

## 2021-05-25 PROCEDURE — 93351 STRESS TTE COMPLETE: CPT | Performed by: INTERNAL MEDICINE

## 2021-06-02 ENCOUNTER — TELEPHONE (OUTPATIENT)
Dept: PULMONOLOGY | Facility: CLINIC | Age: 66
End: 2021-06-02

## 2021-06-02 DIAGNOSIS — R93.89 ABNORMAL CT OF THE CHEST: Primary | ICD-10-CM

## 2021-06-02 NOTE — TELEPHONE ENCOUNTER
Patient calling saying he has been having some issues with his breathing  He states he cannot exercise without feeling very out of shape  He is asking if we can put the order in for his PFTs so he can get that done now instead of waiting until after his CT in October  Please advise

## 2021-06-03 NOTE — TELEPHONE ENCOUNTER
Patient calling again regarding having an order for a PFT put in so he can get it done  Please advise

## 2021-06-03 NOTE — TELEPHONE ENCOUNTER
Attempted to call Brijesh Ortiz to discuss symptoms and plans  No answer  I placed an order for PFTs

## 2021-06-08 ENCOUNTER — TELEPHONE (OUTPATIENT)
Dept: PULMONOLOGY | Facility: CLINIC | Age: 66
End: 2021-06-08

## 2021-06-08 NOTE — TELEPHONE ENCOUNTER
PFT has been scheduled for 6/25   Pt said he is leaving for [place] on the 30th and would like a call with results before going on vacation

## 2021-06-15 ENCOUNTER — TRANSCRIBE ORDERS (OUTPATIENT)
Dept: ADMINISTRATIVE | Facility: HOSPITAL | Age: 66
End: 2021-06-15

## 2021-06-15 DIAGNOSIS — R00.2 PALPITATIONS: Primary | ICD-10-CM

## 2021-06-21 ENCOUNTER — HOSPITAL ENCOUNTER (OUTPATIENT)
Dept: NON INVASIVE DIAGNOSTICS | Facility: HOSPITAL | Age: 66
Discharge: HOME | End: 2021-06-21
Payer: MEDICARE

## 2021-06-21 DIAGNOSIS — R00.2 PALPITATIONS: ICD-10-CM

## 2021-06-21 PROCEDURE — 93225 XTRNL ECG REC<48 HRS REC: CPT

## 2021-06-21 PROCEDURE — 93226 XTRNL ECG REC<48 HR SCAN A/R: CPT

## 2021-06-23 ENCOUNTER — PROCEDURE VISIT (OUTPATIENT)
Dept: UROLOGY | Facility: MEDICAL CENTER | Age: 66
End: 2021-06-23
Payer: MEDICARE

## 2021-06-23 VITALS
HEIGHT: 70 IN | SYSTOLIC BLOOD PRESSURE: 122 MMHG | WEIGHT: 191 LBS | DIASTOLIC BLOOD PRESSURE: 80 MMHG | BODY MASS INDEX: 27.35 KG/M2

## 2021-06-23 DIAGNOSIS — N40.1 BPH WITH URINARY OBSTRUCTION: Primary | ICD-10-CM

## 2021-06-23 DIAGNOSIS — N13.8 BPH WITH URINARY OBSTRUCTION: Primary | ICD-10-CM

## 2021-06-23 DIAGNOSIS — R39.15 URGENCY OF URINATION: ICD-10-CM

## 2021-06-23 PROCEDURE — 99213 OFFICE O/P EST LOW 20 MIN: CPT | Performed by: UROLOGY

## 2021-06-23 PROCEDURE — 52000 CYSTOURETHROSCOPY: CPT | Performed by: UROLOGY

## 2021-06-23 NOTE — PROGRESS NOTES
HISTORY:    Follow-up BPH with mainly overactive bladder symptoms  Has been on tamsulosin for many years     April 2021, I started oxybutynin 10 mg per day, he thinks it helps slightly and actually improved his flow, he is getting up less often at night but still three or 4 times  Daytime voids once per hour  ASSESSMENT / PLAN:    Cysto shows quite large prostate with large median lobe, severe trabeculation, residual about 100 mL today    Options discussed, we will give it another few months on the medication    He is still recovering from a bad COVID episode, has a Holter monitor on his heart today    I discussed TURP as sometimes be necessary to relieve the overactive bladder, it can take up to three months to do so, and in my experience it helps in 90% of cases  10% of the time, the flow is much better after TURP, but still lots of urgency frequency    Follow-up six months with PVR to reassess  The following portions of the patient's history were reviewed and updated as appropriate: allergies, current medications, past family history, past medical history, past social history, past surgical history and problem list     Review of Systems   All other systems reviewed and are negative  Objective:     Cystoscopy     Date/Time 6/23/2021 12:01 PM     Performed by  Mari Jackson MD     Authorized by Mari Jackson MD          Procedure Details:  Procedure type: cystoscopy    Patient tolerance: Patient tolerated the procedure well with no immediate complications    Additional Procedure Details:      Patient presents for cystoscopy  I have discussed the reasons for doing the test, and the potential risks and complications  Patient expressed understanding, and signed informed consent document  The patient was carefully  positioned supine on the examining table  Sterile preparation was performed on the urethra  Xylocaine jelly was instilled and left  Indwelling for the procedure    The [address] [place] [address] flexible cystoscope was passed with the following findings:      Urethra:  No strictures    Prostate:   all three lobes severely enlarged, large median lobe                   Bladder:  Severe trabeculation, no lesions, tumor, or stones  Residual urine:  100 mL    Patient tolerated the procedure well and was escorted from the examining table  Physical Exam  Constitutional:       General: He is not in acute distress  Appearance: He is well-developed  He is not diaphoretic  HENT:      Head: Normocephalic and atraumatic  Eyes:      General: No scleral icterus  Pulmonary:      Effort: Pulmonary effort is normal    Skin:     Coloration: Skin is not pale  Neurological:      Mental Status: He is alert and oriented to person, place, and time  Psychiatric:         Behavior: Behavior normal          Thought Content: Thought content normal          Judgment: Judgment normal              0   Lab Value Date/Time    PSA 0 5 01/06/2021 0808    PSA 0 6 06/19/2019 0743    PSA 0 6 06/02/2018 1006   ]  BUN   Date Value Ref Range Status   01/06/2021 21 7 - 25 mg/dL Final     Creatinine   Date Value Ref Range Status   01/06/2021 1 19 0 70 - 1 25 mg/dL Final     Comment:     For patients >52years of age, the reference limit  for Creatinine is approximately 13% higher for people  identified as -American         11/14/2017 1 12 0 70 - 1 25 mg/dL Final     Comment:     Result Comment: For patients >52years of age, the reference limit  for Creatinine is approximately 13% higher for people  identified as -American         No components found for: CBC      Patient Active Problem List   Diagnosis    BPH with urinary obstruction    Neck pain    Erectile dysfunction of non-organic origin    Hyperglycemia    Mixed hyperlipidemia    Essential hypertension    Migraine headache    Pulmonary nodules    Tinnitus    Primary osteoarthritis of right foot    Screen for colon cancer    Screening for prostate cancer    S/P colonoscopy    Erectile dysfunction    Postnasal drip    Urgency of urination        There are no diagnoses linked to this encounter  Patient ID: Mervat Nuñez is a 72 y o  male        Current Outpatient Medications:     amLODIPine (NORVASC) 10 mg tablet, Take 1 tablet (10 mg total) by mouth daily, Disp: 90 tablet, Rfl: 3    atorvastatin (LIPITOR) 20 mg tablet, Take 1 tablet (20 mg total) by mouth daily, Disp: 90 tablet, Rfl: 3    famotidine (PEPCID) 20 mg tablet, , Disp: , Rfl:     fluticasone (KLS Aller-Danny) 50 mcg/act nasal spray, 2 sprays into each nostril Daily, Disp: , Rfl:     Loratadine 10 MG CAPS, Take by mouth, Disp: , Rfl:     losartan (COZAAR) 25 mg tablet, TAKE 1 TABLET DAILY, Disp: 90 tablet, Rfl: 0    montelukast (SINGULAIR) 10 mg tablet, TAKE 1 TABLET BY MOUTH DAILY AT BEDTIME, Disp: 90 tablet, Rfl: 1    Multiple Vitamins-Minerals (ONE-A-DAY 50 PLUS PO), Take by mouth, Disp: , Rfl:     oxybutynin (DITROPAN-XL) 10 MG 24 hr tablet, Take 1 tablet (10 mg total) by mouth daily at bedtime, Disp: 90 tablet, Rfl: 3    SUMAtriptan (IMITREX) 100 mg tablet, Take by mouth, Disp: , Rfl:     tadalafil (CIALIS) 20 MG tablet, Take 1 tablet (20 mg total) by mouth daily as needed for erectile dysfunction, Disp: 90 tablet, Rfl: 0    tamsulosin (FLOMAX) 0 4 mg, Take 1 capsule (0 4 mg total) by mouth daily, Disp: 90 capsule, Rfl: 3    LORazepam (Ativan) 0 5 mg tablet, take 1 tablet by oral route 1/2 hour prior to CT scan (Patient not taking: Reported on 6/23/2021), Disp: , Rfl:     meclizine (ANTIVERT) 25 mg tablet, Take 1 tablet (25 mg total) by mouth 3 (three) times a day as needed for dizziness (Patient not taking: Reported on 6/23/2021), Disp: 30 tablet, Rfl: 2    Past Medical History:   Diagnosis Date    Alcohol abuse, in remission     Drug dependence, in remission ([facility] [address] [facility]     Head injury     CLOSED HEAD INJURY WITH CONCUSSION    Lung nodule        Past Surgical History: Procedure Laterality Date    COLONOSCOPY      COMPLETE LAST ASSESSED: [id number]    ELBOW SURGERY      LAST ASSESSED: [id number]    NASAL SEPTUM SURGERY      DEVIATION REPAIR LAST ASSESSED: [id number]    SINUS SURGERY      TONSILLECTOMY      LAST ASSESSED: [id number]       Social History

## 2021-06-23 NOTE — LETTER
June 23, 2021     Manav Santiago, [address] [place] [address]    Patient: Rosalina Perez   YOB: 1955   Date of Visit: 6/23/2021       Dear Dr Rebecca Stubbs: Thank you for referring Danutamigdalia Hancock to me for evaluation  Below are my notes for this consultation  If you have questions, please do not hesitate to call me  I look forward to following your patient along with you  Sincerely,        Lamont Lama MD        CC: No Recipients  Lamont Lama MD  6/23/2021 12:02 PM  Incomplete     HISTORY:    Follow-up BPH with mainly overactive bladder symptoms  Has been on tamsulosin for many years     April 2021, I started oxybutynin 10 mg per day, he thinks it helps slightly and actually improved his flow, he is getting up less often at night but still three or 4 times  Daytime voids once per hour  ASSESSMENT / PLAN:    Cysto shows quite large prostate with large median lobe, severe trabeculation, residual about 100 mL today    Options discussed, we will give it another few months on the medication    He is still recovering from a bad COVID episode, has a Holter monitor on his heart today    I discussed TURP as sometimes be necessary to relieve the overactive bladder, it can take up to three months to do so, and in my experience it helps in 90% of cases  10% of the time, the flow is much better after TURP, but still lots of urgency frequency    Follow-up six months with PVR to reassess  The following portions of the patient's history were reviewed and updated as appropriate: allergies, current medications, past family history, past medical history, past social history, past surgical history and problem list     Review of Systems   All other systems reviewed and are negative          Objective:     Cystoscopy     Date/Time 6/23/2021 12:01 PM     Performed by  Lamont Lama MD     Authorized by Lamont Lama MD          Procedure Details:  Procedure type: cystoscopy    Patient tolerance: Patient tolerated the procedure well with no immediate complications    Additional Procedure Details:      Patient presents for cystoscopy  I have discussed the reasons for doing the test, and the potential risks and complications  Patient expressed understanding, and signed informed consent document  The patient was carefully  positioned supine on the examining table  Sterile preparation was performed on the urethra  Xylocaine jelly was instilled and left  Indwelling for the procedure  The 13 Bangl[place]hi flexible cystoscope was passed with the following findings:      Urethra:  No strictures    Prostate:   all three lobes severely enlarged, large median lobe                   Bladder:  Severe trabeculation, no lesions, tumor, or stones  Residual urine:  100 mL    Patient tolerated the procedure well and was escorted from the examining table  Physical Exam  Constitutional:       General: He is not in acute distress  Appearance: He is well-developed  He is not diaphoretic  HENT:      Head: Normocephalic and atraumatic  Eyes:      General: No scleral icterus  Pulmonary:      Effort: Pulmonary effort is normal    Skin:     Coloration: Skin is not pale  Neurological:      Mental Status: He is alert and oriented to person, place, and time  Psychiatric:         Behavior: Behavior normal          Thought Content:  Thought content normal          Judgment: Judgment normal              0   Lab Value Date/Time    PSA 0 5 01/06/2021 0808    PSA 0 6 06/19/2019 0743    PSA 0 6 06/02/2018 1006   ]  BUN   Date Value Ref Range Status   01/06/2021 21 7 - 25 mg/dL Final     Creatinine   Date Value Ref Range Status   01/06/2021 1 19 0 70 - 1 25 mg/dL Final     Comment:     For patients >52years of age, the reference limit  for Creatinine is approximately 13% higher for people  identified as -American         11/14/2017 1 12 0 70 - 1 25 mg/dL Final     Comment:     Result Comment: For patients >52years of age, the reference limit  for Creatinine is approximately 13% higher for people  identified as -American  No components found for: CBC      Patient Active Problem List   Diagnosis    BPH with urinary obstruction    Neck pain    Erectile dysfunction of non-organic origin    Hyperglycemia    Mixed hyperlipidemia    Essential hypertension    Migraine headache    Pulmonary nodules    Tinnitus    Primary osteoarthritis of right foot    Screen for colon cancer    Screening for prostate cancer    S/P colonoscopy    Erectile dysfunction    Postnasal drip    Urgency of urination        There are no diagnoses linked to this encounter  Patient ID: July Dickey is a 72 y o  male        Current Outpatient Medications:     amLODIPine (NORVASC) 10 mg tablet, Take 1 tablet (10 mg total) by mouth daily, Disp: 90 tablet, Rfl: 3    atorvastatin (LIPITOR) 20 mg tablet, Take 1 tablet (20 mg total) by mouth daily, Disp: 90 tablet, Rfl: 3    famotidine (PEPCID) 20 mg tablet, , Disp: , Rfl:     fluticasone (KLS Aller-Danny) 50 mcg/act nasal spray, 2 sprays into each nostril Daily, Disp: , Rfl:     Loratadine 10 MG CAPS, Take by mouth, Disp: , Rfl:     losartan (COZAAR) 25 mg tablet, TAKE 1 TABLET DAILY, Disp: 90 tablet, Rfl: 0    montelukast (SINGULAIR) 10 mg tablet, TAKE 1 TABLET BY MOUTH DAILY AT BEDTIME, Disp: 90 tablet, Rfl: 1    Multiple Vitamins-Minerals (ONE-A-DAY 50 PLUS PO), Take by mouth, Disp: , Rfl:     oxybutynin (DITROPAN-XL) 10 MG 24 hr tablet, Take 1 tablet (10 mg total) by mouth daily at bedtime, Disp: 90 tablet, Rfl: 3    SUMAtriptan (IMITREX) 100 mg tablet, Take by mouth, Disp: , Rfl:     tadalafil (CIALIS) 20 MG tablet, Take 1 tablet (20 mg total) by mouth daily as needed for erectile dysfunction, Disp: 90 tablet, Rfl: 0    tamsulosin (FLOMAX) 0 4 mg, Take 1 capsule (0 4 mg total) by mouth daily, Disp: 90 capsule, Rfl: 3    LORazepam (Ativan) 0 5 mg tablet, take 1 tablet by oral route 1/2 hour prior to CT scan (Patient not taking: Reported on 6/23/2021), Disp: , Rfl:     meclizine (ANTIVERT) 25 mg tablet, Take 1 tablet (25 mg total) by mouth 3 (three) times a day as needed for dizziness (Patient not taking: Reported on 6/23/2021), Disp: 30 tablet, Rfl: 2    Past Medical History:   Diagnosis Date    Alcohol abuse, in remission     Drug dependence, in remission ([facility] [address] )     Head injury     CLOSED HEAD INJURY WITH CONCUSSION    Lung nodule        Past Surgical History:   Procedure Laterality Date    COLONOSCOPY      COMPLETE LAST ASSESSED: [id number]    ELBOW SURGERY      LAST ASSESSED: [id number]    NASAL SEPTUM SURGERY      DEVIATION REPAIR LAST ASSESSED: [id number]    SINUS SURGERY      TONSILLECTOMY      LAST ASSESSED: [id number]       Social History

## 2021-06-25 ENCOUNTER — HOSPITAL ENCOUNTER (OUTPATIENT)
Dept: PULMONOLOGY | Facility: HOSPITAL | Age: 66
Discharge: HOME | End: 2021-06-25
Payer: MEDICARE

## 2021-06-25 DIAGNOSIS — R93.89 ABNORMAL CT OF THE CHEST: ICD-10-CM

## 2021-06-25 PROCEDURE — 94060 EVALUATION OF WHEEZING: CPT

## 2021-06-25 PROCEDURE — 94729 DIFFUSING CAPACITY: CPT

## 2021-06-25 PROCEDURE — 94726 PLETHYSMOGRAPHY LUNG VOLUMES: CPT

## 2021-06-25 PROCEDURE — 94760 N-INVAS EAR/PLS OXIMETRY 1: CPT

## 2021-06-25 PROCEDURE — 94060 EVALUATION OF WHEEZING: CPT | Performed by: INTERNAL MEDICINE

## 2021-06-25 PROCEDURE — 94726 PLETHYSMOGRAPHY LUNG VOLUMES: CPT | Performed by: INTERNAL MEDICINE

## 2021-06-25 PROCEDURE — 94729 DIFFUSING CAPACITY: CPT | Performed by: INTERNAL MEDICINE

## 2021-06-25 RX ORDER — ALBUTEROL SULFATE 2.5 MG/3ML
2.5 SOLUTION RESPIRATORY (INHALATION) ONCE
Status: COMPLETED | OUTPATIENT
Start: 2021-06-25 | End: 2021-06-25

## 2021-06-25 RX ADMIN — ALBUTEROL SULFATE 2.5 MG: 2.5 SOLUTION RESPIRATORY (INHALATION) at 15:30

## 2021-06-28 ENCOUNTER — TELEPHONE (OUTPATIENT)
Dept: PULMONOLOGY | Facility: CLINIC | Age: 66
End: 2021-06-28

## 2021-06-28 DIAGNOSIS — J42 CHRONIC BRONCHITIS, UNSPECIFIED CHRONIC BRONCHITIS TYPE (HCC): Primary | ICD-10-CM

## 2021-06-28 DIAGNOSIS — J44.9 CHRONIC OBSTRUCTIVE PULMONARY DISEASE, UNSPECIFIED COPD TYPE (HCC): Primary | ICD-10-CM

## 2021-06-28 RX ORDER — ALBUTEROL SULFATE 90 UG/1
2 AEROSOL, METERED RESPIRATORY (INHALATION) EVERY 4 HOURS PRN
Qty: 6.7 G | Refills: 2 | Status: SHIPPED | OUTPATIENT
Start: 2021-06-28 | End: 2022-01-17

## 2021-06-28 RX ORDER — ALBUTEROL SULFATE 90 UG/1
2 AEROSOL, METERED RESPIRATORY (INHALATION) EVERY 6 HOURS PRN
Qty: 18 G | Refills: 3 | Status: SHIPPED | OUTPATIENT
Start: 2021-06-28 | End: 2022-01-17 | Stop reason: SDUPTHER

## 2021-06-28 NOTE — TELEPHONE ENCOUNTER
Telephone note- Pulmonary Medicine   Juan Manuel López 72 y o  male MRN: [id number]    Reason for call:    PFT results      Pertinent details:  Mild COPD per PFT results    Reports mild dyspnea with humidity    Plan: PRN albuterol ordered  Continue with planned follow up    BUZZ Mays

## 2021-06-30 PROCEDURE — 93227 XTRNL ECG REC<48 HR R&I: CPT | Performed by: INTERNAL MEDICINE

## 2021-10-15 ENCOUNTER — HOSPITAL ENCOUNTER (OUTPATIENT)
Dept: CT IMAGING | Facility: HOSPITAL | Age: 66
Discharge: HOME | End: 2021-10-15
Attending: INTERNAL MEDICINE
Payer: MEDICARE

## 2021-10-15 DIAGNOSIS — R91.8 PULMONARY NODULES: ICD-10-CM

## 2021-10-15 DIAGNOSIS — Z86.16 HISTORY OF COVID-19: ICD-10-CM

## 2021-10-15 DIAGNOSIS — R93.89 ABNORMAL CT OF THE CHEST: ICD-10-CM

## 2021-10-15 PROCEDURE — G1004 CDSM NDSC: HCPCS

## 2021-10-15 PROCEDURE — 71250 CT THORAX DX C-: CPT

## 2021-11-29 ENCOUNTER — TELEPHONE (OUTPATIENT)
Dept: UROLOGY | Facility: AMBULATORY SURGERY CENTER | Age: 66
End: 2021-11-29

## 2021-12-27 ENCOUNTER — OFFICE VISIT (OUTPATIENT)
Dept: UROLOGY | Facility: MEDICAL CENTER | Age: 66
End: 2021-12-27
Payer: MEDICARE

## 2021-12-27 VITALS
WEIGHT: 188 LBS | HEIGHT: 70 IN | BODY MASS INDEX: 26.92 KG/M2 | DIASTOLIC BLOOD PRESSURE: 78 MMHG | SYSTOLIC BLOOD PRESSURE: 124 MMHG

## 2021-12-27 DIAGNOSIS — R35.0 URINARY FREQUENCY: ICD-10-CM

## 2021-12-27 DIAGNOSIS — N13.8 BPH WITH URINARY OBSTRUCTION: Primary | ICD-10-CM

## 2021-12-27 DIAGNOSIS — N40.1 BPH WITH URINARY OBSTRUCTION: Primary | ICD-10-CM

## 2021-12-27 PROCEDURE — 99214 OFFICE O/P EST MOD 30 MIN: CPT | Performed by: UROLOGY

## 2021-12-27 RX ORDER — PANTOPRAZOLE SODIUM 40 MG/1
TABLET, DELAYED RELEASE ORAL
COMMUNITY
Start: 2021-12-02

## 2021-12-27 RX ORDER — TROSPIUM CHLORIDE 20 MG/1
20 TABLET, FILM COATED ORAL 2 TIMES DAILY
Qty: 60 TABLET | Refills: 3 | Status: SHIPPED | OUTPATIENT
Start: 2021-12-27 | End: 2022-01-24

## 2022-01-17 ENCOUNTER — OFFICE VISIT (OUTPATIENT)
Dept: PULMONOLOGY | Facility: CLINIC | Age: 67
End: 2022-01-17
Payer: MEDICARE

## 2022-01-17 VITALS
SYSTOLIC BLOOD PRESSURE: 132 MMHG | BODY MASS INDEX: 27.81 KG/M2 | HEART RATE: 90 BPM | TEMPERATURE: 97.7 F | DIASTOLIC BLOOD PRESSURE: 84 MMHG | OXYGEN SATURATION: 94 % | WEIGHT: 193.8 LBS

## 2022-01-17 DIAGNOSIS — J44.9 CHRONIC OBSTRUCTIVE PULMONARY DISEASE, UNSPECIFIED COPD TYPE (HCC): ICD-10-CM

## 2022-01-17 DIAGNOSIS — J42 CHRONIC BRONCHITIS, UNSPECIFIED CHRONIC BRONCHITIS TYPE (HCC): ICD-10-CM

## 2022-01-17 DIAGNOSIS — R06.00 DOE (DYSPNEA ON EXERTION): Primary | ICD-10-CM

## 2022-01-17 DIAGNOSIS — R91.8 PULMONARY NODULES: ICD-10-CM

## 2022-01-17 PROBLEM — R06.09 DOE (DYSPNEA ON EXERTION): Status: ACTIVE | Noted: 2022-01-17

## 2022-01-17 PROCEDURE — 99213 OFFICE O/P EST LOW 20 MIN: CPT | Performed by: INTERNAL MEDICINE

## 2022-01-17 RX ORDER — FLUTICASONE FUROATE AND VILANTEROL TRIFENATATE 100; 25 UG/1; UG/1
1 POWDER RESPIRATORY (INHALATION) DAILY
Qty: 60 BLISTER | Refills: 0 | Status: SHIPPED | COMMUNITY
Start: 2022-01-17 | End: 2022-02-16

## 2022-01-17 RX ORDER — ALBUTEROL SULFATE 90 UG/1
2 AEROSOL, METERED RESPIRATORY (INHALATION) EVERY 6 HOURS PRN
Qty: 18 G | Refills: 3 | Status: SHIPPED | OUTPATIENT
Start: 2022-01-17

## 2022-01-17 NOTE — ASSESSMENT & PLAN NOTE
Very mild, variable, no clear etiology but given his spirometry with partial bronchodilator improvement and significant improvement on the level of small airways, normal vital capacity, probably this could be a mild case of asthma specially with his history of allergies  I gave patient Breo 100/25 sample and a showed him how to use and he used it in the office, he will report in 2 weeks if he feels better  Otherwise I told him to uses p r n  albuterol 10-15 minutes before his exercise regimes and significant exertion

## 2022-01-17 NOTE — PROGRESS NOTES
Progress note - Pulmonary Medicine   Mervat Nuñez 77 y o  male MRN: [id number]       Impression & Plan:     DELUNA (dyspnea on exertion)  Very mild, variable, no clear etiology but given his spirometry with partial bronchodilator improvement and significant improvement on the level of small airways, normal vital capacity, probably this could be a mild case of asthma specially with his history of allergies  I gave patient Breo 100/25 sample and a showed him how to use and he used it in the office, he will report in 2 weeks if he feels better  Otherwise I told him to uses p r n  albuterol 10-15 minutes before his exercise regimes and significant exertion  Pulmonary nodules  Stable, no need to repeat CT scan  Diagnoses and all orders for this visit:    DELUNA (dyspnea on exertion)  -     fluticasone-vilanterol (Breo Ellipta) 100-25 mcg/inh inhaler; Inhale 1 puff daily Rinse mouth after use  Chronic obstructive pulmonary disease, unspecified COPD type (HCC)    Pulmonary nodules    Chronic bronchitis, unspecified chronic bronchitis type (HCC)  -     albuterol (Ventolin HFA) 90 mcg/act inhaler; Inhale 2 puffs every 6 (six) hours as needed for wheezing      ______________________________________________________________________    HPI:    Mervat Nuñez presents today for follow-up of mild dyspnea on exertion  Patient has no history of pulmonary disease, he is a former cigar smoker only and quit 15 years ago, no history of asthma or COPD  He has some nasal allergy with allergic sinusitis sometimes  Patient has been complaining of mild dyspnea on exertion usually with significant exertion, denies wheezing, denies fever chills or night sweats, denies cough or sputum production  He denies any chest pain or heaviness with exertion  He describes his dyspnea on exertion as variable and not all the time but almost on daily basis  He is not using his p r n  albuterol    Patient had COVID-19 infection several months ago and had some opacities on his CT scan  Otherwise he feels fine, he exercises routinely, he is a professional arm wrestler practices twice weekly  Sometimes he feels shortness of breath during that      Current Medications:    Current Outpatient Medications:     atorvastatin (LIPITOR) 20 mg tablet, TAKE 1 TABLET DAILY, Disp: 90 tablet, Rfl: 0    famotidine (PEPCID) 20 mg tablet, , Disp: , Rfl:     fluticasone (KLS Aller-Danny) 50 mcg/act nasal spray, 2 sprays into each nostril Daily, Disp: , Rfl:     Loratadine 10 MG CAPS, Take by mouth, Disp: , Rfl:     LORazepam (Ativan) 0 5 mg tablet, take 1 tablet by oral route 1/2 hour prior to CT scan, Disp: , Rfl:     losartan (COZAAR) 25 mg tablet, TAKE 1 TABLET DAILY, Disp: 90 tablet, Rfl: 0    montelukast (SINGULAIR) 10 mg tablet, TAKE 1 TABLET BY MOUTH DAILY AT BEDTIME, Disp: 90 tablet, Rfl: 0    Multiple Vitamins-Minerals (ONE-A-DAY 50 PLUS PO), Take by mouth, Disp: , Rfl:     pantoprazole (PROTONIX) 40 mg tablet, , Disp: , Rfl:     SUMAtriptan (IMITREX) 100 mg tablet, Take by mouth, Disp: , Rfl:     tadalafil (CIALIS) 20 MG tablet, Take 1 tablet (20 mg total) by mouth daily as needed for erectile dysfunction, Disp: 90 tablet, Rfl: 0    tamsulosin (FLOMAX) 0 4 mg, TAKE 1 CAPSULE DAILY, Disp: 90 capsule, Rfl: 0    trospium chloride (SANCTURA) 20 mg tablet, Take 1 tablet (20 mg total) by mouth 2 (two) times a day, Disp: 60 tablet, Rfl: 3    albuterol (PROVENTIL HFA,VENTOLIN HFA) 90 mcg/act inhaler, Inhale 2 puffs every 4 (four) hours as needed for wheezing or shortness of breath (Patient not taking: Reported on 11/11/2021 ), Disp: 6 7 g, Rfl: 2    albuterol (Ventolin HFA) 90 mcg/act inhaler, Inhale 2 puffs every 6 (six) hours as needed for wheezing (Patient not taking: Reported on 11/11/2021 ), Disp: 18 g, Rfl: 3    amLODIPine (NORVASC) 10 mg tablet, Take 1 tablet (10 mg total) by mouth daily (Patient not taking: Reported on 1/17/2022 ), Disp: 90 tablet, Rfl: 3    meclizine (ANTIVERT) 25 mg tablet, Take 1 tablet (25 mg total) by mouth 3 (three) times a day as needed for dizziness (Patient not taking: Reported on 2021), Disp: 30 tablet, Rfl: 2    Review of Systems:  Review of Systems   Constitutional: Negative  HENT: Negative  Eyes: Negative  Respiratory: Positive for shortness of breath  Cardiovascular: Negative  Gastrointestinal: Negative  Endocrine: Negative  Genitourinary: Negative  Musculoskeletal: Negative  Skin: Negative  Allergic/Immunologic: Negative  Neurological: Negative  Hematological: Negative  Psychiatric/Behavioral: Negative  Aside from what is mentioned in the HPI, the review of systems is otherwise negative    Past medical history, surgical history, and family history were reviewed and updated as appropriate    Social history updates:  Social History     Tobacco Use   Smoking Status Former Smoker    Years:     Types: Cigars    Start date:     Quit date:     Years since quittin 0   Smokeless Tobacco Former User    Types: [address] date:    Tobacco Comment    NO [address] [place] [address]       PhysicalExamination:  Vitals:   /84   Pulse 90   Temp 97 7 °F (36 5 °C)   Wt 87 9 kg (193 lb 12 8 oz)   SpO2 94%   BMI 27 81 kg/m²     General: alert, not in acute distress  HEENT: PERRL, no icteric sclera or cyanosis, no thrush  Neck:  Supple, no lymphadenopathy or thyromegaly, no JVD  Lungs:  Equal breath sounds and clear auscultations bilaterally, no wheezing or crackles  Heart: S1S2 regular, no murmures or gallops  Abdomen: soft, non-tender, bowel sounds  present  Extrimities: no edema, no clubbing or cyanosis  Neuro: Alert and oriented x 3, no focal neurodeficits   Skin: intact, no rashes    Diagnostic Data:  Labs:   I personally reviewed the most recent laboratory data pertinent to today's visit    Lab Results   Component Value Date    WBC 5 1 2021 HGB 15 3 01/06/2021    HCT 46 4 01/06/2021    MCV 93 7 01/06/2021     (L) 01/06/2021     Lab Results   Component Value Date    SODIUM 141 01/06/2021    K 4 2 01/06/2021    CO2 27 01/06/2021     01/06/2021    BUN 21 01/06/2021    CREATININE 1 19 01/06/2021    CALCIUM 9 1 01/06/2021       PFT results: The most recent pulmonary function tests were reviewed  Normal spirometry with no obstruction, normal vital capacity  No significant but partial bronchodilator response, there was significant improvement in the small airways/FEF 25-75%  Normal lung volumes  Normal diffusion capacity  Flow volume loop may suggest some obstruction    Imaging:  I personally reviewed the images on the [facility] system pertinent to today's visit  Chest CT scan reviewed on PACs:  Near complete resolution of ground-glass opacities, stable tiny left lung nodules  44 mm ascending aortic aneurysm    Other studies:  Echocardiogram:  LVEF 66%, normal RV    Cardiac echo stress test:IMPRESSIONS:  1  Good exercise tolerance  2  Negative graded treadmill stress test for symptoms of exertional angina pectoris or electrocardiographic changes of ischemia  3  Normal resting left ventricular systolic function  4  Normal exercise left ventricular systolic function  5  Normal stress echocardiogram without evidence of prior myocardial infarction and without evidence of exercise induced myocardial ischemia    For 8 Holter monitor in 2021:CONCLUSIONS:  1  Holter monitor reveals the underlying rhythm is sinus rhythm with an average heart rate of 78 beats per minute, a minimum heart rate of 49 beats per minute and a maximum heart rate of 156 beats per minute  2  There are no ventricular ectopy noted   3  There are rare supraventricular ectopy comprised of rare isolated APCs and late beats  4  There is no evidence of significant bradyarrhythmia or advanced heart block  The longest R to R was 1 3 seconds    5  Patient diary events correlated predominantly with sinus rhythm and rarely with sinus tachycardia         Napoleon Mcmillan MD

## 2022-01-24 ENCOUNTER — TELEPHONE (OUTPATIENT)
Dept: UROLOGY | Facility: MEDICAL CENTER | Age: 67
End: 2022-01-24

## 2022-01-24 NOTE — TELEPHONE ENCOUNTER
Patient called in to report that his medication was switched from Oxybutynin 10 mg to Tropicum 20 mg but the Tropicum did not work well for him so he switched himself back to the Oxybutynin and it is working out well for him  He needs a refill called into his pharmacy for Oxybutynin as he is out of medication

## 2022-07-12 ENCOUNTER — HOSPITAL ENCOUNTER (OUTPATIENT)
Dept: NON INVASIVE DIAGNOSTICS | Facility: HOSPITAL | Age: 67
Discharge: HOME | End: 2022-07-12
Payer: MEDICARE

## 2022-07-12 ENCOUNTER — HOSPITAL ENCOUNTER (OUTPATIENT)
Dept: CT IMAGING | Facility: HOSPITAL | Age: 67
Discharge: HOME | End: 2022-07-12
Payer: MEDICARE

## 2022-07-12 VITALS
SYSTOLIC BLOOD PRESSURE: 132 MMHG | DIASTOLIC BLOOD PRESSURE: 84 MMHG | WEIGHT: 193 LBS | BODY MASS INDEX: 27.63 KG/M2 | HEART RATE: 80 BPM | HEIGHT: 70 IN

## 2022-07-12 DIAGNOSIS — I71.10 THORACIC ANEURYSM, RUPTURED: ICD-10-CM

## 2022-07-12 DIAGNOSIS — R06.00 DYSPNEA, UNSPECIFIED TYPE: ICD-10-CM

## 2022-07-12 LAB
AORTIC ROOT: 3.5 CM
AORTIC VALVE MEAN VELOCITY: 7.8 M/S
APICAL FOUR CHAMBER EJECTION FRACTION: 56 %
ASCENDING AORTA: 4.1 CM
AV LVOT MEAN GRADIENT: 2 MMHG
AV LVOT PEAK GRADIENT: 5 MMHG
AV MEAN GRADIENT: 3 MMHG
AV PEAK GRADIENT: 6 MMHG
AV VELOCITY RATIO: 0.92
DOP CALC AO PEAK VEL: 1.23 M/S
DOP CALC AO VTI: 20.96 CM
DOP CALC LVOT PEAK VEL VTI: 19.26 CM
DOP CALC LVOT PEAK VEL: 1.13 M/S
E WAVE DECELERATION TIME: 152 MS
FRACTIONAL SHORTENING: 42 % (ref 28–44)
INTERVENTRICULAR SEPTUM IN DIASTOLE (PARASTERNAL SHORT AXIS VIEW): 0.9 CM
INTERVENTRICULAR SEPTUM: 0.9 CM (ref 0.6–1.1)
LAAS-AP4: 11.2 CM2
LEFT ATRIUM SIZE: 3.7 CM
LEFT INTERNAL DIMENSION IN SYSTOLE: 2.6 CM (ref 2.1–4)
LEFT VENTRICULAR INTERNAL DIMENSION IN DIASTOLE: 4.5 CM (ref 3.5–6)
LEFT VENTRICULAR POSTERIOR WALL IN END DIASTOLE: 0.9 CM
LEFT VENTRICULAR STROKE VOLUME: 68 ML
LVSV (TEICH): 68 ML
MV E'TISSUE VEL-SEP: 7 CM/S
MV PEAK A VEL: 0.76 M/S
MV PEAK E VEL: 55 CM/S
MV STENOSIS PRESSURE HALF TIME: 44 MS
MV VALVE AREA P 1/2 METHOD: 5 CM2
RIGHT VENTRICLE ID DIMENSION: 4.3 CM
SL CV LV EF: 56
SL CV PED ECHO LEFT VENTRICLE DIASTOLIC VOLUME (MOD BIPLANE) 2D: 92 ML
SL CV PED ECHO LEFT VENTRICLE SYSTOLIC VOLUME (MOD BIPLANE) 2D: 24 ML
TR MAX PG: 24 MMHG
TR PEAK VELOCITY: 2.5 M/S
TRICUSPID VALVE PEAK REGURGITATION VELOCITY: 2.46 M/S

## 2022-07-12 PROCEDURE — 93306 TTE W/DOPPLER COMPLETE: CPT

## 2022-07-12 PROCEDURE — 71250 CT THORAX DX C-: CPT

## 2022-07-12 PROCEDURE — G1004 CDSM NDSC: HCPCS

## 2022-07-12 PROCEDURE — 93306 TTE W/DOPPLER COMPLETE: CPT | Performed by: INTERNAL MEDICINE

## 2022-08-01 ENCOUNTER — OFFICE VISIT (OUTPATIENT)
Dept: PULMONOLOGY | Facility: CLINIC | Age: 67
End: 2022-08-01
Payer: MEDICARE

## 2022-08-01 VITALS
SYSTOLIC BLOOD PRESSURE: 118 MMHG | BODY MASS INDEX: 27.2 KG/M2 | HEART RATE: 90 BPM | DIASTOLIC BLOOD PRESSURE: 62 MMHG | RESPIRATION RATE: 18 BRPM | WEIGHT: 190 LBS | HEIGHT: 70 IN | OXYGEN SATURATION: 98 %

## 2022-08-01 DIAGNOSIS — R06.09 DOE (DYSPNEA ON EXERTION): Primary | ICD-10-CM

## 2022-08-01 DIAGNOSIS — R91.8 PULMONARY NODULES: ICD-10-CM

## 2022-08-01 PROCEDURE — 99213 OFFICE O/P EST LOW 20 MIN: CPT | Performed by: INTERNAL MEDICINE

## 2022-08-01 NOTE — PROGRESS NOTES
Office Progress Note - Pulmonary    Miriam Gallegos 77 y o  male MRN: [id number]    Encounter: [id number]      Assessment:   Dyspnea  Nonspecific   Pulmonary nodules  Benign  Plan:    Regular exercise   No indication for further imaging follow-up studies for the pulmonary nodules   Follow-up as needed  Discussion:   The patient's dyspnea use nonspecific  The patient's 2D echo was unremarkable  His PFTs were also unremarkable  I have reassured the patient and advised him to remain active and exercise  The pulmonary nodules are benign  No indication for follow-up imaging studies  I have not scheduled the patient for another appointment however I will be happy to see him in the future if the need arises  Subjective: The patient is here for follow-up visit  He still has dyspnea on exertion  This is when he is bending over  He is active and walks about a mile a day  Denies cough, wheezing or sputum production  No nocturnal symptoms  He had a CT scan of the chest done for his ordered continue resume  Review of systems:  A 12 point system review is done and aside from what is stated above the rest of the review of systems is negative  Family history and social history are reviewed  Medications list is reviewed  Vitals: Blood pressure 118/62, pulse 90, resp  rate 18, height 5' 10" (1 778 m), weight 86 2 kg (190 lb), SpO2 98 %  ,     Physical Exam  Gen: Awake, alert, oriented x 3, no acute distress  HEENT: Mucous membranes moist, no oral lesions, no thrush  NECK: No accessory muscle use, JVP not elevated  Cardiac: Regular, single S1, single S2, no murmurs, no rubs, no gallops  Lungs:  Clear breath sounds  No wheezing or rhonchi  Abdomen: normoactive bowel sounds, soft nontender, nondistended, no rebound or rigidity, no guarding  Extremities: no cyanosis, no clubbing, no edema  Neuro:  Grossly nonfocal   Skin:  No rash  Complete pulmonary function test is reviewed  Normal total lung capacity  Mild air trapping  Normal airflow on vital capacity  No significant improvement in airflow or vital capacity following the administration of bronchodilators  Normal diffusion capacity  CT scan of the chest is reviewed on the [facility] system  It showed stable pulmonary nodules compared to the study from 2018

## 2022-08-15 ENCOUNTER — OFFICE VISIT (OUTPATIENT)
Dept: SLEEP CENTER | Facility: CLINIC | Age: 67
End: 2022-08-15
Payer: MEDICARE

## 2022-08-15 VITALS
DIASTOLIC BLOOD PRESSURE: 71 MMHG | HEIGHT: 70 IN | HEART RATE: 72 BPM | SYSTOLIC BLOOD PRESSURE: 122 MMHG | BODY MASS INDEX: 27.2 KG/M2 | WEIGHT: 190 LBS

## 2022-08-15 DIAGNOSIS — R29.818 SUSPECTED SLEEP APNEA: Primary | ICD-10-CM

## 2022-08-15 DIAGNOSIS — G47.33 OSA (OBSTRUCTIVE SLEEP APNEA): ICD-10-CM

## 2022-08-15 PROCEDURE — 99203 OFFICE O/P NEW LOW 30 MIN: CPT | Performed by: PSYCHIATRY & NEUROLOGY

## 2022-08-15 NOTE — PROGRESS NOTES
Assessment/Plan:      1  Suspected sleep apnea  Assessment & Plan:  Although he is not obese, we must consider obstructive sleep apnea given loud snoring, tiredness  He also has risk factors including hypertension and his age  Overall he would be considered a moderate risk for obstructive sleep apnea  We discussed there are cases where treatment of obstructive sleep apnea can result in improvement in erectile dysfunction  To further assess, we will plan for a home sleep test   I will then see him back in a follow-up after his test to review the results and determine if treatment is needed  Orders:  -     Home Study; Future; Expected date: 08/15/2022    2  KARMEN (obstructive sleep apnea)  -     Home Study; Future; Expected date: 08/15/2022           Subjective:      Patient ID: Ashish Fajardo is a 77 y o  male  The patient is a 77year old male presents as a new patient  Has had tiredness, nocturia, loud snoring, and recent erectile problems    He works as a , works from 6 am-8 am, 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   Then returns to work from 130-4 pm, though sometimes later if there is a trip  He is off for the summers    During the school year he goes to bed 1030 pm and is up at 6 AM   In the summer goes to bed 1030 pm and is up at 730 AM    He is refreshed when he wakes  Does not doze  Naps when inactive on breaks, ep[place] 4  Feels sleepy often  No drowsy driving, no hx of MVA or close call from sleepiness    No witnessed apneas  , no gasping or choking  Not aware of factors that make snoring better or worse    No sleepwalking  No dream enactment  No RLS  Drinks 1 pot of coffee a day  Former alcoholic, last drank 22 years ago     Sunray Sleepiness Scale:     Sitting and reading: Would never doze  Watching TV: Would never doze  Sitting, inactive in a public place (e g  a theatre or a meeting):  Would never doze  As a passenger in a car for an hour without a break: Would never doze  Lying down to rest in the afternoon when circumstances permit: High chance of dozing  Sitting and talking to someone: Would never doze  Sitting quietly after a lunch without alcohol: Slight chance of dozing  In a car, while stopped for a few minutes in traffic: Would never doze  Total score: 4     The following portions of the patient's history were reviewed and updated as appropriate: allergies, current medications, past family history, past medical history, past social history, past surgical history, and problem list     Review of Systems       Genitourinary need to urinate more than twice a night and difficulty with erection   Cardiology none   Gastrointestinal frequent heartburn/acid reflux   Neurology difficulty with memory   Constitutional fatigue   Integumentary none   Psychiatry none   Musculoskeletal sciatica   Pulmonary shortness of breath with activity and snoring   ENT ringing in ears   Endocrine frequent urination   Hematological none       Objective:        /71 (BP Location: Left arm, Patient Position: Sitting, Cuff Size: Large)   Pulse 72   Ht 5' 10" (1 778 m)   Wt 86 2 kg (190 lb)   BMI 27 26 kg/m²     Physical Exam  Constitutional:       Appearance: Normal appearance  HENT:      Head: Normocephalic and atraumatic  Mouth/Throat:      Mouth: Mucous membranes are moist    Eyes:      Extraocular Movements: Extraocular movements intact  Pupils: Pupils are equal, round, and reactive to light  Cardiovascular:      Rate and Rhythm: Normal rate  Pulses: Normal pulses  Heart sounds: Normal heart sounds  Pulmonary:      Effort: Pulmonary effort is normal       Breath sounds: Normal breath sounds  Musculoskeletal:      Right lower leg: No edema  Left lower leg: No edema  Neurological:      Mental Status: He is alert  Psychiatric:         Mood and Affect: Mood normal          Behavior: Behavior normal          Thought Content:  Thought content normal  Judgment: Judgment normal         15 75 inch neck circumference  Mallampati 4, elongated soft palate, thickened tongue    Data reviewed-notes from Pulmonary Medicine to correlate with history

## 2022-08-15 NOTE — PROGRESS NOTES
Review of Systems      Genitourinary need to urinate more than twice a night and difficulty with erection   Cardiology none   Gastrointestinal frequent heartburn/acid reflux   Neurology difficulty with memory   Constitutional fatigue   Integumentary none   Psychiatry none   Musculoskeletal sciatica   Pulmonary shortness of breath with activity and snoring   ENT ringing in ears   Endocrine frequent urination   Hematological none

## 2022-08-18 PROBLEM — R29.818 SUSPECTED SLEEP APNEA: Status: ACTIVE | Noted: 2022-08-18

## 2022-08-18 NOTE — ASSESSMENT & PLAN NOTE
Although he is not obese, we must consider obstructive sleep apnea given loud snoring, tiredness  He also has risk factors including hypertension and his age  Overall he would be considered a moderate risk for obstructive sleep apnea  We discussed there are cases where treatment of obstructive sleep apnea can result in improvement in erectile dysfunction  To further assess, we will plan for a home sleep test   I will then see him back in a follow-up after his test to review the results and determine if treatment is needed

## 2022-09-17 PROBLEM — R93.1 ABNORMAL ECHOCARDIOGRAM: Status: ACTIVE | Noted: 2022-09-17

## 2022-09-19 ENCOUNTER — CONSULT (OUTPATIENT)
Dept: CARDIOLOGY CLINIC | Facility: CLINIC | Age: 67
End: 2022-09-19
Payer: MEDICARE

## 2022-09-19 VITALS
HEIGHT: 70 IN | SYSTOLIC BLOOD PRESSURE: 130 MMHG | WEIGHT: 190.8 LBS | DIASTOLIC BLOOD PRESSURE: 84 MMHG | BODY MASS INDEX: 27.32 KG/M2 | HEART RATE: 74 BPM

## 2022-09-19 DIAGNOSIS — R93.1 ABNORMAL ECHOCARDIOGRAM: Primary | ICD-10-CM

## 2022-09-19 DIAGNOSIS — I10 ESSENTIAL HYPERTENSION: ICD-10-CM

## 2022-09-19 DIAGNOSIS — R06.09 DOE (DYSPNEA ON EXERTION): ICD-10-CM

## 2022-09-19 DIAGNOSIS — E78.2 MIXED HYPERLIPIDEMIA: ICD-10-CM

## 2022-09-19 PROCEDURE — 99215 OFFICE O/P EST HI 40 MIN: CPT | Performed by: INTERNAL MEDICINE

## 2022-09-19 PROCEDURE — 93000 ELECTROCARDIOGRAM COMPLETE: CPT | Performed by: INTERNAL MEDICINE

## 2022-09-19 NOTE — PROGRESS NOTES
[facility]  [address] [place] [address], [place] [address] [address]  Phone#  [phone]   Fax#  [phone]  *-*-*-*-*-*-*-*-*-*-*-*-*-*-*-*-*-*-*-*-*-*-*-*-*-*-*-*-*-*-*-*-*-*-*-*-*-*-*-*-*-*-*-*-*-*-*-*-*-*-*-*-*-*                                              Cardiology Consultation       ENCOUNTER DATE: 22 11:52 AM  PATIENT NAME: Bhavana Gonsalves   : 1955    MRN: [id number]  AGE:66 y o  SEX: male  [address] Sang Copeland MD     PRIMARY CARE PHYSICIAN: Kings Huff DO    ACTIVE DIAGNOSIS THIS VISIT  1  Abnormal echocardiogram  POCT ECG   2  DELUNA (dyspnea on exertion)  POCT ECG   3  Mixed hyperlipidemia     4  Essential hypertension       ACTIVE PROBLEM LIST  Patient Active Problem List   Diagnosis    BPH with urinary obstruction    Neck pain    Erectile dysfunction of non-organic origin    Hyperglycemia    Mixed hyperlipidemia    Essential hypertension    Migraine headache    Pulmonary nodules    Tinnitus    Primary osteoarthritis of right foot    Screen for colon cancer    Screening for prostate cancer    S/P colonoscopy    Erectile dysfunction    Postnasal drip    Urgency of urination    DELUNA (dyspnea on exertion)    Suspected sleep apnea    Abnormal echocardiogram       CARDIOLOGY SPECIALTY COMMENTS  Patient referred for shortness of breath and abnormal echocardiogram    2021 stress echocardiogram: Exercised for 7 minutes and 35 sec obtaining target heart rate 97% of MPHR  Negative for angina or EKG changes  Normal resting and exercise systolic function  Negative stress echocardiogram for myocardial ischemia    2021 Holter monitor: Heart rate average 78 bpm  Heart rate  bpm  No ventricular ectopy and rare supraventricular ectopy  Longest RR interval 1 3 seconds    2022 echocardiogram: Normal left ventricular systolic function, EF 08%  Mild concentric LVH  Grade 1 diastolic dysfunction  Mild AR and FL   Mildly dilated ascending aorta 4 1 cm     07/12/2022 CT of chest without contrast: Ascending thoracic aorta measures 43 x 42 mm    HPI:    Patient referred for evaluation of shortness of breath and an abnormal echocardiogram       Patient states that he had had COVID and was very short of breath at the time but has gradually improved and presently does not have significant shortness of breath  Patient has some minor abnormalities on his echocardiogram   He has normal left ventricular systolic function but has mild concentric left ventricular hypertrophy  He also has grade 1 left ventricular diastolic dysfunction, mild ascending aortic root dilatation measuring 4 1 cm on echo, mild aortic regurgitation and mild pulmonic regurgitation  He also has had a CT of the chest which demonstrated ascending aortic enlargement measuring 43 x 42 mm    DISCUSSION/PLAN:       · Patient has mild left ventricular hypertrophy is probably related to history of systemic hypertension  · Grade 1 left ventricular dysfunction is related to hypertension and is reversible with cardiovascular exercise  Patient states that he walks about an hour day doing a mile in 10 minutes  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  · The walking at a brisk pace is excellent and should reduce his diastolic dysfunction  · The heavy weightlifting will make diastolic dysfunction worse because it results in a significant increase in blood pressure during the activity  · Patient's aortic root is dilated at 4 1-4 3 cm a  This degree of dilatation represents a mild increase in aortic root and ascending aorta size  · Explained to patient that weight lifting will resulted in an increased rate of enlargement of the ascending aorta and maximum weight lifted should be restricted to 25 lb  · Patient has mild aortic insufficiency    Weight lifting will also make aortic insufficiency worse and again would recommend a 25 lb weight restriction  · The pulmonic regurgitation is not of a significant concern because the right heart is a low-pressure chamber  · Patient stated, "I my professional arm wrestler and I will lift heavy weights" I am not sure the professional arm wrestling is good for his ascending aorta or aortic regurgitation either since I would suspect that it would result in a major increase in blood pressure during the activity  · I can only inform him of the risks of his behavior, it is up to him whether he wishes to comply    · He needs to have yearly  repeat echocardiograms and CT scan without contrast   · If the CT demonstrates a significant increase in aortic diameter, he then needs a CTA to confirm it  · I gave him the option of having Dr Jerry Albarado repeat these tests and monitoring them for change or to see him yearly and do the same  He chose to have Dr Jerry Albarado repeat these tests and monitor him for change  · I have not given him a return appointment but would be glad to see him in the future if the need arises        Lab Studies:    Lab Results   Component Value Date    CHOLESTEROL 170 01/06/2021    CHOLESTEROL 169 12/31/2019    CHOLESTEROL 164 06/19/2019     Lab Results   Component Value Date    TRIG 115 01/06/2021    TRIG 195 (H) 12/31/2019    TRIG 181 (H) 06/19/2019     Lab Results   Component Value Date    HDL 44 01/06/2021    HDL 39 (L) 12/31/2019    HDL 37 (L) 06/19/2019     Lab Results   Component Value Date    LDLCALC 105 (H) 01/06/2021    LDLCALC 99 12/31/2019    LDLCALC 99 06/19/2019     Lab Results   Component Value Date    NONHDL 126 01/06/2021    NONHDL 130 (H) 12/31/2019    NONHDL 127 06/19/2019     Lab Results   Component Value Date    LDLDIRECT 105 05/23/2017    LDLDIRECT 118 08/08/2016       Lab Results   Component Value Date    HGBA1C 6 0 (H) 01/06/2021      Lab Results   Component Value Date    SODIUM 141 01/06/2021    SODIUM 141 12/31/2019    SODIUM 139 06/19/2019    K 4 2 01/06/2021    K 4 4 12/31/2019 K 4 5 06/19/2019     01/06/2021     12/31/2019     06/19/2019    CO2 27 01/06/2021    CO2 29 12/31/2019    CO2 27 06/19/2019    BUN 21 01/06/2021    BUN 24 12/31/2019    BUN 25 06/19/2019    CREATININE 1 19 01/06/2021    CREATININE 1 25 12/31/2019    CREATININE 1 09 06/19/2019     Lab Results   Component Value Date    WBC 5 1 01/06/2021    WBC 5 1 06/19/2019    WBC 5 0 06/02/2018    HGB 15 3 01/06/2021    HGB 15 6 06/19/2019    HGB 16 2 06/02/2018    HCT 46 4 01/06/2021    HCT 46 0 06/19/2019    HCT 46 3 06/02/2018    MCV 93 7 01/06/2021    MCV 91 5 06/19/2019    MCV 91 1 06/02/2018    MCH 30 9 01/06/2021    MCH 31 0 06/19/2019    MCH 31 9 06/02/2018    MCHC 33 0 01/06/2021    MCHC 33 9 06/19/2019    MCHC 35 0 06/02/2018     (L) 01/06/2021     06/19/2019     06/02/2018      Lab Results   Component Value Date    CALCIUM 9 1 01/06/2021    CALCIUM 9 6 12/31/2019    CALCIUM 9 3 06/19/2019    AST 22 01/06/2021    AST 25 12/31/2019    AST 23 06/19/2019    ALT 25 01/06/2021    ALT 32 12/31/2019    ALT 37 06/19/2019    ALKPHOS 43 01/06/2021    ALKPHOS 46 12/31/2019    ALKPHOS 43 06/19/2019    PROT 6 8 11/14/2017    PROT 6 8 05/23/2017    PROT 7 2 11/28/2016    BILITOT 0 5 11/14/2017    BILITOT 0 6 05/23/2017    BILITOT 0 6 11/28/2016     Lab Results   Component Value Date    TSH 1 37 06/19/2019    TSH 1 25 06/02/2018       Results for orders placed or performed in visit on 09/19/22   POCT ECG    Narrative    Normal sinus rhythm at a rate of 74 beats per minute  Left axis deviation  Abnormal EKG           Current Outpatient Medications:     amLODIPine (NORVASC) 10 mg tablet, Take 1 tablet (10 mg total) by mouth daily, Disp: 90 tablet, Rfl: 3    atorvastatin (LIPITOR) 20 mg tablet, TAKE 1 TABLET DAILY, Disp: 90 tablet, Rfl: 0    famotidine (PEPCID) 20 mg tablet, , Disp: , Rfl:     fluticasone (FLONASE) 50 mcg/act nasal spray, 2 sprays into each nostril Daily, Disp: , Rfl:    Loratadine 10 MG CAPS, Take by mouth, Disp: , Rfl:     losartan (COZAAR) 25 mg tablet, TAKE 1 TABLET DAILY, Disp: 90 tablet, Rfl: 0    montelukast (SINGULAIR) 10 mg tablet, TAKE 1 TABLET BY MOUTH EVERYDAY AT BEDTIME, Disp: 90 tablet, Rfl: 0    Multiple Vitamins-Minerals (ONE-A-DAY 50 PLUS PO), Take by mouth, Disp: , Rfl:     oxybutynin (DITROPAN-XL) 10 MG 24 hr tablet, Take 1 tablet (10 mg total) by mouth daily at bedtime, Disp: 90 tablet, Rfl: 3    pantoprazole (PROTONIX) 40 mg tablet, , Disp: , Rfl:     SUMAtriptan (IMITREX) 100 mg tablet, Take by mouth, Disp: , Rfl:     tadalafil (CIALIS) 20 MG tablet, Take 1 tablet (20 mg total) by mouth daily as needed for erectile dysfunction, Disp: 90 tablet, Rfl: 0    tamsulosin (FLOMAX) 0 4 mg, TAKE 1 CAPSULE DAILY, Disp: 90 capsule, Rfl: 0    albuterol (Ventolin HFA) 90 mcg/act inhaler, Inhale 2 puffs every 6 (six) hours as needed for wheezing (Patient not taking: No sig reported), Disp: 18 g, Rfl: 3    fluticasone-vilanterol (Breo Ellipta) 100-25 mcg/inh inhaler, Inhale 1 puff daily Rinse mouth after use   (Patient not taking: Reported on 8/1/2022), Disp: 60 blister, Rfl: 0    LORazepam (ATIVAN) 0 5 mg tablet, take 1 tablet by oral route 1/2 hour prior to CT scan (Patient not taking: Reported on 9/19/2022), Disp: , Rfl:     meclizine (ANTIVERT) 25 mg tablet, Take 1 tablet (25 mg total) by mouth 3 (three) times a day as needed for dizziness (Patient not taking: Reported on 9/19/2022), Disp: 30 tablet, Rfl: 2  No Known Allergies    Past Medical History:   Diagnosis Date    Alcohol abuse, in remission     Drug dependence, in remission ([facility] [address] )     Head injury     CLOSED HEAD INJURY WITH CONCUSSION    Lung nodule      Social History     Socioeconomic History    Marital status: Single     Spouse name: Not on file    Number of children: Not on file    Years of education: Not on file    Highest education level: Not on file   Occupational History    Occupation:      Comment: EMPLOYED   Tobacco Use    Smoking status: Former Smoker     Years: 5 00     Types: Cigars     Start date:      Quit date:      Years since quittin 7    Smokeless tobacco: Former User     Types: Chew     Quit date:     Tobacco comment:  [address] Use    Vaping Use: Never used   Substance and Sexual Activity    Alcohol use: No     Comment: RECOVERING ALCOHOLIC SINCE   DAILY ALCOHOL CONSUMPTIO WAS 30 BEERS PER DAY   Drug use: No     Types: Cocaine, "Crack" cocaine, LSD, Marijuana     Comment: FORMERLY ADDICTED TO COCAINE, CRACK COCAINE, FORMERLY USED MARIJUANA REGULALRLY, AND USED LSD    Sexual activity: Not on file   Other Topics Concern    Not on file   Social History Narrative    CAFFEINE USE - CONSUMES ON AVERAGE 3 CUPS OF COFFEE PER DAY     AS PER [employer]     Social Determinants of Health     Financial Resource Strain: Not on file   Food Insecurity: Not on file   Transportation Needs: Not on file   Physical Activity: Not on file   Stress: Not on file   Social Connections: Not on file   Intimate Partner Violence: Not on file   Housing Stability: Not on file      Family History   Problem Relation Age of Onset    Other Mother         HEADACHE    Heart disease Mother     Hypertension Mother     Hyperlipidemia Mother         PURE    Bipolar disorder Daughter         NOS    Other Daughter         HEADACHE    Irritable bowel syndrome Daughter     Sleep apnea Neg Hx      Past Surgical History:   Procedure Laterality Date    COLONOSCOPY      COMPLETE LAST ASSESSED: [id number]    ELBOW SURGERY      LAST ASSESSED: [id number]    NASAL SEPTUM SURGERY      DEVIATION REPAIR LAST ASSESSED: [id number]    SINUS SURGERY      TONSILLECTOMY      LAST ASSESSED: [id number]       Review of Systems:  Review of Systems   Constitutional: Negative  HENT: Negative  Eyes: Negative      Respiratory: Negative for chest tightness and shortness of breath  Cardiovascular: Negative for chest pain, palpitations and leg swelling  Gastrointestinal: Negative  Endocrine: Negative  Musculoskeletal: Negative  Skin: Negative  Allergic/Immunologic: Negative  Neurological: Negative  Hematological: Negative  Psychiatric/Behavioral: Negative  Physical Exam:  /84   Pulse 74   Ht 5' 10" (1 778 m)   Wt 86 5 kg (190 lb 12 8 oz)   BMI 27 38 kg/m²     PREVIOUS WEIGHTS:   Wt Readings from Last 10 Encounters:   09/19/22 86 5 kg (190 lb 12 8 oz)   08/15/22 86 2 kg (190 lb)   08/01/22 86 2 kg (190 lb)   07/12/22 87 5 kg (193 lb)   01/17/22 87 9 kg (193 lb 12 8 oz)   12/27/21 85 3 kg (188 lb)   11/11/21 86 2 kg (190 lb)   06/23/21 86 6 kg (191 lb)   04/22/21 86 6 kg (191 lb)   04/16/21 88 5 kg (195 lb 3 2 oz)      Physical Exam  Vitals reviewed  Constitutional:       General: He is not in acute distress  Appearance: He is well-developed  HENT:      Head: Normocephalic and atraumatic  Neck:      Thyroid: No thyromegaly  Vascular: No carotid bruit or JVD  Trachea: No tracheal deviation  Cardiovascular:      Rate and Rhythm: Normal rate and regular rhythm  Pulses: Normal pulses  Heart sounds: Normal heart sounds  No murmur heard  No friction rub  No gallop  Pulmonary:      Effort: Pulmonary effort is normal  No respiratory distress  Breath sounds: Normal breath sounds  No wheezing, rhonchi or rales  Chest:      Chest wall: No tenderness  Abdominal:      General: Bowel sounds are normal  There is no distension  Palpations: Abdomen is soft  Tenderness: There is no abdominal tenderness  Musculoskeletal:      Cervical back: Normal range of motion and neck supple  Right lower leg: No edema  Left lower leg: No edema  Skin:     General: Skin is warm and dry  Neurological:      General: No focal deficit present        Mental Status: He is alert and oriented to person, place, and time  Gait: Gait normal    Psychiatric:         Mood and Affect: Mood normal          Behavior: Behavior normal          Thought Content: Thought content normal          Judgment: Judgment normal        --------------------------------------------------------------------------------  ECHO:   Name: Ashish Fajardo                       : 1955  MRN: [id number]                       Age: 77 y o  Patient Status: Outpatient          Gender: male    Vitals    Height Weight BSA (Calculated - m2) BP Pulse   5' 10" (1 778 m) 87 5 kg (193 lb) 2 06 sq meters 132/84 80       PACS Images     Show images for Echo complete w/ contrast if indicated    Study Details    This transthoracic echocardiogram was performed in the echo lab  This was a routine, outpatient study  Study quality was adequate  A complete 2D, color flow Doppler, spectral Doppler, 2D, color flow Doppler and spectral Doppler transthoracic echocardiogram was performed  The apical, parasternal, subcostal and suprasternal views were obtained  Indications  Priority: Routine  Dx: Thoracic aneurysm, ruptured ([address] ) [I71 1 (ICD-10-CM)]; Dyspnea, unspecified type [R06 00 (ICD-10-CM)]       History    HTN, HLD, Drug Dependence       Interpretation Summary         Left Ventricle: Left ventricular cavity size is normal  Wall thickness is mildly increased  There is mild concentric hypertrophy  The left ventricular ejection fraction is 56%  Systolic function is normal  Wall motion is normal  Diastolic function is mildly abnormal, consistent with grade I (abnormal) relaxation    Aortic Valve: There is mild regurgitation    Pulmonic Valve: There is mild regurgitation    Aorta: The ascending aorta is mildly dilated  The ascending aorta is 4 1 cm          Findings    Left Ventricle Left ventricular cavity size is normal  Wall thickness is mildly increased  There is mild concentric hypertrophy   The left ventricular ejection fraction is 56%  Systolic function is normal   Wall motion is normal  Diastolic function is mildly abnormal, consistent with grade I (abnormal) relaxation  Right Ventricle Right ventricular cavity size is normal  Systolic function is normal  Wall thickness is normal    Left Atrium The atrium is normal in size  Right Atrium The atrium is normal in size  Aortic Valve The aortic valve is trileaflet  The leaflets are mildly thickened  The leaflets are mildly calcified  The leaflets exhibit normal mobility  There is mild regurgitation  The aortic valve has no significant stenosis  Mitral Valve The mitral valve has normal structure and function  There is no evidence of regurgitation  There is no evidence of stenosis  Tricuspid Valve Tricuspid valve structure is normal  There is trace regurgitation  There is no evidence of stenosis  Pulmonic Valve Pulmonic valve structure is normal  There is mild regurgitation  There is no evidence of stenosis  Ascending Aorta The ascending aorta is mildly dilated  The ascending aorta is 4 1 cm  IVC/SVC The inferior vena cava is normal in size  Pericardium There is no pericardial effusion  The pericardium is normal in appearance                 Left Ventricle Measurements    Function/Volumes   A4C EF 56 %         Dimensions   LVIDd 4 5 cm         LVIDS 2 6 cm         IVSd 0 9 cm         LVPWd 0 9 cm         FS 42 %         Diastolic Filling   MV E' Tissue Velocity Septal 7 cm/s         E wave deceleration time 152 ms         MV Peak E Baljinder 55 cm/s         MV Peak A Baljinder 0 76 m/s          Report Measurements   AV LVOT peak gradient 5 mmHg              Interventricular Septum Measurements    Shunt Ratio   LVOT peak VTI 19 26 cm         LVOT peak baljinder 1 13 m/s              Right Ventricle Measurements    Dimensions   RVID d 4 3 cm               Left Atrium Measurements    Dimensions   LA size 3 7 cm               Atrial Septum Measurements    Shunt Ratio   LVOT peak VTI 19 26 cm         LVOT peak baljinder 1 13 m/s               Aortic Valve Measurements    Stenosis   Aortic valve peak velocity 1 23 m/s         LVOT peak baljinder 1 13 m/s         Ao VTI 20 96 cm         LVOT peak VTI 19 26 cm         AV mean gradient 3 mmHg         LVOT mn grad 2 mmHg         AV peak gradient 6 mmHg         AV LVOT peak gradient 5 mmHg         Dimensionless velociy index 0 92                Mitral Valve Measurements    Stenosis   MV stenosis pressure 1/2 time 44 ms         MV valve area p 1/2 method 5 cm2               Tricuspid Valve Measurements    RVSP Parameters   TR Peak Baljinder 2 5 m/s         Triscuspid Valve Regurgitation Peak Gradient 24 mmHg               Aorta Measurements    Aortic Dimensions   Ao root 3 5 cm         Asc Ao 4 1 cm                --------------------------------------------------------------------------------  HOLTER  Results for orders placed during the hospital encounter of 21    Holter monitor - 48 hour    Narrative  PT NAME: Natalia Roe  : 1955  AGE: 72 y o  GENDER: male  MRN: [id number]   PROCEDURE: Holter monitor - 48 hour    INDICATIONS: Palpitations    FINDINGS:  Total beats: 716,612    Ventricular Ectopy  Total VE Beats: 0  VE percentage: 0%    Supraventricular Ectopy  Total SVE Beat: 58  SVE percentage: 0%    Atrial Fibrillation  AF beats: 0  AF percentage 0%    CONCLUSIONS:  1  Holter monitor reveals the underlying rhythm is sinus rhythm with an average heart rate of 78 beats per minute, a minimum heart rate of 49 beats per minute and a maximum heart rate of 156 beats per minute  2  There are no ventricular ectopy noted  3  There are rare supraventricular ectopy comprised of rare isolated APCs and late beats  4  There is no evidence of significant bradyarrhythmia or advanced heart block  The longest R to R was 1 3 seconds  5  Patient diary events correlated predominantly with sinus rhythm and rarely with sinus tachycardia      Signed:  Nadine Dudley DO, FACC, FACP    ======================================================   I have personally reviewed pertinent reports  I spent 60 minutes on the patient's office visit  This time was spent on the day of the visit  I had direct contact with the patient in the office on the day of the visit  Greater than 50% of the total time was spent obtaining a history, examining patient, answering all patient questions, arranging and discussing plan of care with patient as well as directly providing instructions  Additional time then spent on orders and office chart  Portions of the record may have been created with voice recognition software  Occasional wrong word or "sound a like" substitutions may have occurred due to the inherent limitations of voice recognition software  Read the chart carefully and recognize, using context, where substitutions have occurred      SIGNATURES:   Scott Frazier MD

## 2023-01-13 RX ORDER — TADALAFIL 10 MG/1
10 TABLET ORAL DAILY PRN
COMMUNITY
Start: 2022-12-27

## 2023-01-18 ENCOUNTER — OFFICE VISIT (OUTPATIENT)
Dept: UROLOGY | Facility: MEDICAL CENTER | Age: 68
End: 2023-01-18

## 2023-01-18 VITALS
HEART RATE: 79 BPM | DIASTOLIC BLOOD PRESSURE: 80 MMHG | BODY MASS INDEX: 27.77 KG/M2 | HEIGHT: 70 IN | SYSTOLIC BLOOD PRESSURE: 110 MMHG | WEIGHT: 194 LBS

## 2023-01-18 DIAGNOSIS — R35.0 URINARY FREQUENCY: Primary | ICD-10-CM

## 2023-01-18 DIAGNOSIS — N13.8 BPH WITH URINARY OBSTRUCTION: ICD-10-CM

## 2023-01-18 DIAGNOSIS — Z12.5 PROSTATE CANCER SCREENING: ICD-10-CM

## 2023-01-18 DIAGNOSIS — R68.82 DECREASED LIBIDO: ICD-10-CM

## 2023-01-18 DIAGNOSIS — N40.1 BPH WITH URINARY OBSTRUCTION: ICD-10-CM

## 2023-01-18 LAB
POST-VOID RESIDUAL VOLUME, ML POC: 28 ML
SL AMB  POCT GLUCOSE, UA: NORMAL
SL AMB LEUKOCYTE ESTERASE,UA: NORMAL
SL AMB POCT BILIRUBIN,UA: NORMAL
SL AMB POCT BLOOD,UA: NORMAL
SL AMB POCT CLARITY,UA: CLEAR
SL AMB POCT COLOR,UA: YELLOW
SL AMB POCT KETONES,UA: NORMAL
SL AMB POCT NITRITE,UA: NORMAL
SL AMB POCT PH,UA: 6.5
SL AMB POCT SPECIFIC GRAVITY,UA: 1.01
SL AMB POCT URINE PROTEIN: NORMAL
SL AMB POCT UROBILINOGEN: 0.2

## 2023-01-18 RX ORDER — OXYBUTYNIN CHLORIDE 10 MG/1
10 TABLET, EXTENDED RELEASE ORAL
Qty: 90 TABLET | Refills: 3 | Status: SHIPPED | OUTPATIENT
Start: 2023-01-18

## 2023-01-18 NOTE — PROGRESS NOTES
1/18/2023      Chief Complaint   Patient presents with   • BPH WITH URINARY OBSTRUCTION         Assessment and Plan    79 y o  male managed by     1  BPH with LUTS  · S/p office cystoscopy in June 2021 revealed large prostate with median lobe  · Dr Aniya Holly reviewed TURP vs medication  He remains well controlled on Flomax 0 4 mg po daily  · No refills needed today  · Urine today: negative   · PVR: 28 mL     2  OAB   · Managed on oxybutynin XL 10 mg po daily   · Refills sent   · Reviewed bladder irritants  · Encourage daily water intake 40-60 oz  Discontinue fluids prior to bed  3  Prostate cancer screening  · Previous PSAs: 0 5 (1/6/21), 0 6 (6/19/19), 0 6 (6/2/18)   · JOSEY today revealed smooth, mildly enlarged prostate without appreciable nodule  · PSA in 2 weeks  Reviewed activities to avoid 72 hours prior to testing  · Will call with results  4  ED  · Managed on Cialis 20 mg po Q3 days prn  · Issues maintaining erections despite above medication  Discussed supplementing medication with metal ring device vs IC injections  · Patient wishes to think about his options and will let us know how he wishes to proceed  5  Decreased libido  · Accompanied with fatigue  Previous TSH testing WNL  Testosterone ordered to evaluate possible hypogonadism  History of Present Illness  Rachelle Johnson is a 79 y o  male here for evaluation of BPH, OAB, prostate cancer screening, and ED  Patient is s/p office cystoscopy in June 2021 which revealed enlarged prostate with a median lobe  Dr Aniya Holly discussed options with patient and decided to attempt management on oral medication in order to avoid surgical intervention  He has been managed on both Flomax and oxybutynin from a BPH and OAB perspective  He was briefly transitioned to [place] and [place] but had inevitably gone back to the oxybutynin due to decreased effectiveness    Patient tolerates the oxybutynin well and denies any difficulty voiding, dry mouth, or constipation  He denies any dysuria or gross hematuria  She does drink about 1 pot of coffee a day but also supplements this with water intake  He does not routinely consume other bladder irritants  He has been managed on Cialis from an ED perspective  He was managed on Viagra years ago and states this is not as effective  He is able to obtain erections on the medication but is unable to maintain erections  Patient is prediabetic  He also notes decreased libido and fatigue but denies muscle wasting or increased agitation  He does not have any history of prostate cancer in his family  No recent PSA testing  Review of Systems   Constitutional: Positive for fatigue  Negative for chills and fever  HENT:        Denies dry mouth   Respiratory: Negative  Cardiovascular: Negative  Gastrointestinal: Negative for constipation  Genitourinary: Positive for frequency  Negative for difficulty urinating, dysuria, flank pain, hematuria, scrotal swelling, testicular pain and urgency  Nocturia fluctuates between 1-4 per night  Slow stream at night  Reports good control during the day  Denies incontinence  Decreased libido    Musculoskeletal: Negative  Skin: Negative  Neurological: Negative  Psychiatric/Behavioral: Negative for agitation  AUA SYMPTOM SCORE    Flowsheet Row Most Recent Value   AUA SYMPTOM SCORE    How often have you had a sensation of not emptying your bladder completely after you finished urinating? 0   How often have you had to urinate again less than two hours after you finished urinating? 5   How often have you found you stopped and started again several times when you urinate? 0   How often have you found it difficult to postpone urination? 1   How often have you had a weak urinary stream? 1   How often have you had to push or strain to begin urination?  0   How many times did you most typically get up to urinate from the time you went to bed at night until the time you got up in the morning? 1   Quality of Life: If you were to spend the rest of your life with your urinary condition just the way it is now, how would you feel about that? 3   AUA SYMPTOM SCORE 8           Vitals  Vitals:    01/18/23 0912   BP: 110/80   BP Location: Left arm   Patient Position: Sitting   Cuff Size: Large   Pulse: 79   Weight: 88 kg (194 lb)   Height: 5' 10" (1 778 m)       Physical Exam  Vitals reviewed  Constitutional:       General: He is not in acute distress  Appearance: Normal appearance  He is not ill-appearing, toxic-appearing or diaphoretic  HENT:      Head: Normocephalic and atraumatic  Eyes:      Conjunctiva/sclera: Conjunctivae normal    Pulmonary:      Effort: Pulmonary effort is normal  No respiratory distress  Abdominal:      General: There is no distension  Palpations: Abdomen is soft  Tenderness: There is no abdominal tenderness  There is no right CVA tenderness, left CVA tenderness, guarding or rebound  Genitourinary:     Comments: JOSEY today revealed smooth, mildly enlarged prostate without appreciable nodule  Musculoskeletal:         General: Normal range of motion  Cervical back: Normal range of motion  Skin:     General: Skin is warm and dry  Neurological:      General: No focal deficit present  Mental Status: He is alert and oriented to person, place, and time  Psychiatric:         Mood and Affect: Mood normal          Behavior: Behavior normal          Thought Content:  Thought content normal          Judgment: Judgment normal        Past History  Past Medical History:   Diagnosis Date   • Alcohol abuse, in remission    • Drug dependence, in remission ([facility] [address] )    • Head injury     CLOSED HEAD INJURY WITH CONCUSSION   • Lung nodule      Social History     Socioeconomic History   • Marital status: Single     Spouse name: None   • Number of children: None   • Years of education: None   • Highest education level: None   Occupational History   • Occupation:      Comment: EMPLOYED   Tobacco Use   • Smoking status: Former     Types: Cigars     Start date:      Quit date:      Years since quittin 0   • Smokeless tobacco: Former     Types: Chew     Quit date:    • Tobacco comments:      [address] Use   • Vaping Use: Never used   Substance and Sexual Activity   • Alcohol use: No     Comment: RECOVERING ALCOHOLIC SINCE 2596  DAILY ALCOHOL CONSUMPTIO WAS 30 BEERS PER DAY     • Drug use: No     Types: Cocaine, "Crack" cocaine, LSD, Marijuana     Comment: FORMERLY ADDICTED TO COCAINE, CRACK COCAINE, FORMERLY USED MARIJUANA REGULALRLY, AND USED LSD   • Sexual activity: None   Other Topics Concern   • None   Social History Narrative    CAFFEINE USE - CONSUMES ON AVERAGE 3 CUPS OF COFFEE PER DAY     [facility]     Social Determinants of Health     Financial Resource Strain: Not on file   Food Insecurity: Not on file   Transportation Needs: Not on file   Physical Activity: Not on file   Stress: Not on file   Social Connections: Not on file   Intimate Partner Violence: Not on file   Housing Stability: Not on file     Social History     Tobacco Use   Smoking Status Former   • Types: Cigars   • Start date:    • Quit date:    • Years since quittin 0   Smokeless Tobacco Former   • Types: Chew   • Quit date:    Tobacco Comments    NO SECONHAND SMOKE EXPOSURE     Family History   Problem Relation Age of Onset   • Other Mother         HEADACHE   • Heart disease Mother    • Hypertension Mother    • Hyperlipidemia Mother         PURE   • Bipolar disorder Daughter         NOS   • Other Daughter         HEADACHE   • Irritable bowel syndrome Daughter    • Sleep apnea Neg Hx        The following portions of the patient's history were reviewed and updated as appropriate: allergies, current medications, past medical history, past social history, past surgical history and problem list     Results  No results found for this or any previous visit (from the past 1 hour(s)) ]  Lab Results   Component Value Date    PSA 0 5 01/06/2021    PSA 0 6 06/19/2019    PSA 0 6 06/02/2018     Lab Results   Component Value Date    CALCIUM 9 1 01/06/2021     11/14/2017    K 4 2 01/06/2021    CO2 27 01/06/2021     01/06/2021    BUN 21 01/06/2021    CREATININE 1 19 01/06/2021     Lab Results   Component Value Date    WBC 5 1 01/06/2021    HGB 15 3 01/06/2021    HCT 46 4 01/06/2021    MCV 93 7 01/06/2021     (L) 01/06/2021     Timo Hamilton PA-C

## 2023-01-18 NOTE — PATIENT INSTRUCTIONS
PSA blood test in 2 weeks (no sooner)  Important to avoid any ejaculation or direct pressure to the scrotum 3 days before testing  Will call with results  Continue Flomax and oxybutynin for urinary symptoms       Follow up in 1 year

## 2023-02-02 LAB — PSA SERPL-MCNC: 0.35 NG/ML

## 2023-02-03 DIAGNOSIS — Z12.5 PROSTATE CANCER SCREENING: Primary | ICD-10-CM

## 2023-02-08 LAB
PSA SERPL-MCNC: 0.35 NG/ML
TESTOST FREE SERPL-MCNC: 58.7 PG/ML (ref 35–155)
TESTOST SERPL-MCNC: 448 NG/DL (ref 250–1100)

## 2023-05-05 ENCOUNTER — TELEPHONE (OUTPATIENT)
Dept: SLEEP CENTER | Facility: CLINIC | Age: 68
End: 2023-05-05

## 2023-05-05 NOTE — TELEPHONE ENCOUNTER
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

## 2023-05-08 ENCOUNTER — OFFICE VISIT (OUTPATIENT)
Dept: SLEEP CENTER | Facility: CLINIC | Age: 68
End: 2023-05-08

## 2023-05-08 VITALS
HEIGHT: 70 IN | SYSTOLIC BLOOD PRESSURE: 143 MMHG | WEIGHT: 202 LBS | DIASTOLIC BLOOD PRESSURE: 86 MMHG | HEART RATE: 118 BPM | BODY MASS INDEX: 28.92 KG/M2

## 2023-05-08 DIAGNOSIS — R06.83 SNORING: ICD-10-CM

## 2023-05-08 NOTE — PROGRESS NOTES
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, I would like him to have the home sleep test that was ordered  I have expedited this and it will be performed tomorrow  We discussed if the test does not show obstructive sleep apnea, or even if mild obstructive sleep apnea is identified he would likely be cleared by me  If more significant disease is noted, PAP may be needed,       Subjective:      Patient ID: Jn Rosales is a 79 y o  male  HPI    The patient returns in follow-up, he notes that since he last saw me his symptoms are improved  For example he feels that he does not snore at all, no witnessed apneas or gasping in sleep  He continues to work as a   Works from [id number] am and 2-4 pm       On a work  Night goes to bed 1030 pm and wakes 6 am   Vearl Manjeet once, returns to sleep easily  Keeps the same schedule    He is refreshed when he wakes, Does not doze  Does not take naps  No drowsy driving, no close calls or MVA from sleepiness    His allergies are flaring up but these are better than they used to be  No nasal congestion  , no sneezing  Has fluticasone prescribed  Uses montelukast      No longer has dyspnea  Does not use an inhaler, does not feel that he needs it   Feels his breahting is improved, no dyspnea  [place] Sleepiness Scale  Sitting and reading: Would never doze  Watching TV: Would never doze  Sitting, inactive in a public place (e g  a theatre or a meeting):  Would never doze  As a passenger in a car for an hour without a break: Would never doze  Lying down to rest in the afternoon when circumstances permit: Would never doze  Sitting and talking to someone: Would never doze  Sitting quietly after a lunch without alcohol: Would never doze  In a car, while stopped for a few minutes in "traffic: Would never doze  Total score: 0      Review of Systems      Objective:      /86 (BP Location: Left arm, Patient Position: Sitting, Cuff Size: Adult)   Pulse (!) 118   Ht 5' 10\" (1 778 m)   Wt 91 6 kg (202 lb)   BMI 28 98 kg/m²          Physical Exam    RRR  Lungs CTA b/l  "

## 2023-05-09 ENCOUNTER — HOSPITAL ENCOUNTER (OUTPATIENT)
Dept: SLEEP CENTER | Facility: CLINIC | Age: 68
Discharge: HOME | End: 2023-05-09

## 2023-05-09 DIAGNOSIS — G47.33 OSA (OBSTRUCTIVE SLEEP APNEA): ICD-10-CM

## 2023-05-09 DIAGNOSIS — R29.818 SUSPECTED SLEEP APNEA: ICD-10-CM

## 2023-05-10 DIAGNOSIS — G47.33 OSA (OBSTRUCTIVE SLEEP APNEA): Primary | ICD-10-CM

## 2023-05-10 NOTE — PROGRESS NOTES
Home Sleep Study Documentation    HOME STUDY DEVICE: Noxturnal yes                                           Trice G3 no      Pre-Sleep Home Study:    Set-up and instructions performed by: MARTINA Perez, RST, CRT    Technician performed demonstration for Patient: yes    Return demonstration performed by Patient: yes    Written instructions provided to Patient: yes    Patient signed consent form: yes        Post-Sleep Home Study:    Additional comments by Patient: none    Home Sleep Study Failed:no:    Failure reason: N/A    Reported or Detected: N/A    Scored by: JUJU Gentile, MARTINA

## 2023-05-11 ENCOUNTER — TELEPHONE (OUTPATIENT)
Dept: SLEEP CENTER | Facility: CLINIC | Age: 68
End: 2023-05-11

## 2023-05-11 NOTE — TELEPHONE ENCOUNTER
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

## 2023-05-11 NOTE — TELEPHONE ENCOUNTER
I called the pt  to move his anabell  up a week into next week, LVM  If we can schedule him for next Tuesday and on that should be enough time to get his order processed

## 2023-05-11 NOTE — TELEPHONE ENCOUNTER
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

## 2023-05-11 NOTE — TELEPHONE ENCOUNTER
Sleep study shows severe KARMEN   ELIZABET 30 4    Spoke to the patient scheduled DME set up and compliance appointments  Offered patient 7/5/2023 with Dr Susana Dsouza for compliance and patient declined  Scheduled compliance for 8/22/2020    RX and clinicals sent to [facility]    Also requested expedited delivery of equipment   E mail sent to [facility]

## 2023-05-17 LAB
DME PARACHUTE DELIVERY DATE ACTUAL: NORMAL
DME PARACHUTE DELIVERY DATE EXPECTED: NORMAL
DME PARACHUTE DELIVERY DATE REQUESTED: NORMAL
DME PARACHUTE DELIVERY NOTE: NORMAL
DME PARACHUTE ITEM DESCRIPTION: NORMAL
DME PARACHUTE ORDER STATUS: NORMAL
DME PARACHUTE SUPPLIER NAME: NORMAL
DME PARACHUTE SUPPLIER PHONE: NORMAL

## 2023-05-18 ENCOUNTER — TELEPHONE (OUTPATIENT)
Dept: SLEEP CENTER | Facility: CLINIC | Age: 68
End: 2023-05-18

## 2023-05-18 NOTE — TELEPHONE ENCOUNTER
Pt  came into the \[facility]\"" office to be set up on Auto PAP   Kojo Gary gave a ResMed S11 set at 5-15cm and gave an [address] (M)

## 2023-08-22 ENCOUNTER — OFFICE VISIT (OUTPATIENT)
Dept: SLEEP CENTER | Facility: CLINIC | Age: 68
End: 2023-08-22
Payer: MEDICARE

## 2023-08-22 VITALS
WEIGHT: 200 LBS | DIASTOLIC BLOOD PRESSURE: 85 MMHG | HEART RATE: 83 BPM | HEIGHT: 70 IN | SYSTOLIC BLOOD PRESSURE: 138 MMHG | BODY MASS INDEX: 28.63 KG/M2

## 2023-08-22 DIAGNOSIS — G47.33 OSA (OBSTRUCTIVE SLEEP APNEA): Primary | ICD-10-CM

## 2023-08-22 PROCEDURE — 99214 OFFICE O/P EST MOD 30 MIN: CPT | Performed by: PHYSICIAN ASSISTANT

## 2023-08-22 RX ORDER — SIMVASTATIN 40 MG
40 TABLET ORAL DAILY
COMMUNITY
Start: 2023-06-01

## 2023-08-22 NOTE — ASSESSMENT & PLAN NOTE
Patient was diagnosed with severe obstructive sleep apnea on a home sleep study. 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 He is currently using his CPAP 97% of the time greater than 4 hours 97% of the time average usage is 7 hours and 50 minutes per night with a residual AHI of 2.0. He is fully compliant with his CPAP I filled out a form to be returned to his medical examiner. I advised him to continue using his CPAP nightly and follow-up in 1 year or sooner if he has any concerns.

## 2023-08-22 NOTE — PATIENT INSTRUCTIONS
Continue to use your CPAP nightly. We will follow-up with you in 1 year. Call sooner if you have any concerns. You are compliant for your  physical.    Nursing Support:  When: Monday through Friday 7A-5PM except holidays  Where: Our direct line is [phone]. If you are having a true emergency please call 911. In the event that the line is busy or it is after hours please leave a voice message and we will return your call. Please speak clearly, leaving your full name, birth date, best number to reach you and the reason for your call. Medication refills: We will need the name of the medication, the dosage, the ordering provider, whether you get a 30 or 90 day refill, and the pharmacy name and address. Medications will be ordered by the provider only. Nurses cannot call in prescriptions. Please allow 7 days for medication refills. Physician requested updates: If your provider requested that you call with an update after starting medication, please be ready to provide us the medication and dosage, what time you take your medication, the time you attempt to fall asleep, time you fall asleep, when you wake up, and what time you get out of bed. Sleep Study Results: We will contact you with sleep study results and/or next steps after the physician has reviewed your testing.

## 2023-08-22 NOTE — PROGRESS NOTES
Progress Note - [address] 79 [address].o. male   GGT:17/43/1852, MRN: [id number]  8/22/2023          Follow Up Evaluation / Problem:     Obstructive Sleep Apnea      Thank you for the opportunity of participating in the evaluation and care of this patient in the Sleep Clinic at [address] [place] [address]. HPI: Harmeet Ellis is a 79y.o. year old male. The patient presents for follow up of severe obstructive sleep apnea. A home sleep study performed on 5/9/2023 which showed severe obstructive sleep apnea with an ELIZABET of 30.4 and oxygen rusty of 75%. 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 He was set up with auto CPAP and presents today to review compliance and effectiveness of treatment.         Current Outpatient Medications:   •  atorvastatin (LIPITOR) 20 mg tablet, TAKE 1 TABLET DAILY, Disp: 90 tablet, Rfl: 0  •  famotidine (PEPCID) 20 mg tablet, , Disp: , Rfl:   •  fluticasone (FLONASE) 50 mcg/act nasal spray, 2 sprays into each nostril Daily, Disp: , Rfl:   •  Loratadine 10 MG CAPS, Take by mouth, Disp: , Rfl:   •  losartan (COZAAR) 25 mg tablet, TAKE 1 TABLET DAILY, Disp: 90 tablet, Rfl: 0  •  montelukast (SINGULAIR) 10 mg tablet, TAKE 1 TABLET BY MOUTH EVERYDAY AT BEDTIME, Disp: 90 tablet, Rfl: 0  •  Multiple Vitamins-Minerals (ONE-A-DAY 50 PLUS PO), Take by mouth, Disp: , Rfl:   •  oxybutynin (DITROPAN-XL) 10 MG 24 hr tablet, Take 1 tablet (10 mg total) by mouth daily at bedtime, Disp: 90 tablet, Rfl: 3  •  pantoprazole (PROTONIX) 40 mg tablet, , Disp: , Rfl:   •  tadalafil (CIALIS) 10 MG tablet, Take 10 mg by mouth daily as needed, Disp: , Rfl:   •  tadalafil (CIALIS) 20 MG tablet, Take 1 tablet (20 mg total) by mouth daily as needed for erectile dysfunction, Disp: 90 tablet, Rfl: 0  •  tamsulosin (FLOMAX) 0.4 mg, TAKE 1 CAPSULE DAILY, Disp: 90 capsule, Rfl: 0  •  amLODIPine (NORVASC) 10 mg tablet, Take 1 tablet (10 mg total) by mouth daily (Patient not taking: Reported on 5/8/2023), Disp: 90 tablet, Rfl: 3  •  LORazepam (ATIVAN) 0.5 mg tablet, take 1 tablet by oral route 1/2 hour prior to CT scan (Patient not taking: Reported on 8/22/2023), Disp: , Rfl:   •  simvastatin (ZOCOR) 40 mg tablet, Take 40 mg by mouth daily, Disp: , Rfl:   •  SUMAtriptan (IMITREX) 100 mg tablet, Take by mouth (Patient not taking: Reported on 1/18/2023), Disp: , Rfl:     How likely are you to doze off or fall asleep in the following situations, in contrast to feeling just tired? Sitting and reading: Would never doze  Watching TV: Would never doze  Sitting, inactive in a public place (e.g. a theatre or a meeting): Would never doze  As a passenger in a car for an hour without a break: Would never doze  Lying down to rest in the afternoon when circumstances permit: Would never doze  Sitting and talking to someone: Would never doze  Sitting quietly after a lunch without alcohol: Would never doze  In a car, while stopped for a few minutes in traffic: Would never doze  Total score: 0              Vitals:    08/22/23 1508   BP: 138/85   BP Location: Left arm   Patient Position: Sitting   Cuff Size: Large   Pulse: 83   Weight: 90.7 kg (200 lb)   Height: 5' 10" (1.778 m)       Body mass index is 28.7 kg/m². EPWORTH SLEEPINESS SCORE  Total score: 0      Past History Since Last Sleep Center Visit:     Patient states he started using auto CPAP. He has no major concerns or complaints with his treatment. He states his wife is happier as he is not snoring. 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 He states he was not sleepy during the day previously and he feels exactly the same. His only improvement is that he has less sinus congestion in the morning.     The patient reports that he cleans the equipment appropriately and changes supplies on a regular basis.    The review of systems and following portions of the patient's history were reviewed and updated as appropriate: allergies, current medications, past family history, past medical history, past social history, past surgical history, and problem list.        OBJECTIVE  Equipment set up date: 5/18/2023, ResMed S11  PAP Pressure: Nasal AutoPAP using a lower limit of 5 cm and an upper limit of 15 cm of water pressure  Type of mask used: full face  DME Provider: Xander Rivera  Denies aerophagia or AM headaches    Physical Exam:     General Appearance:   Alert, cooperative, no distress, appears stated age                                                             ASSESSMENT / PLAN    1. KARMEN (obstructive sleep apnea)  Assessment & Plan:  Patient was diagnosed with severe obstructive sleep apnea on a home sleep study. He drives [employer] and needs to be in compliance in order to maintain his certification. He is currently using his CPAP 97% of the time greater than 4 hours 97% of the time average usage is 7 hours and 50 minutes per night with a residual AHI of 2.0. He is fully compliant with his CPAP I filled out a form to be returned to his medical examiner. I advised him to continue using his CPAP nightly and follow-up in 1 year or sooner if he has any concerns. Orders:  -     PAP DME Resupply/Reorder           Counseling / Coordination of Care  I have spent a total time of 30 minutes on 08/22/23 in caring for this patient including Diagnostic results, Prognosis, Risks and benefits of tx options, Instructions for management, Patient and family education, Importance of tx compliance, Risk factor reductions, Impressions, Counseling / Coordination of care, Documenting in the medical record, Reviewing / ordering tests, medicine, procedures   and Obtaining or reviewing history  . Today I reviewed the patient's compliance data. he has been able to use the equipment 97% of all days recorded.   Average usage was 4 or more hours 97% of all days recorded. The patient uses the equipment for an average of 7 hours and 50 minutes per night. The estimated AHI is 2.0 abnormal breathing events per hour. The patient feels they benefit from the use of PAP equipment and would like to continue PAP therapy. Response to treatment has been good. A prescription for supplies has been provided to last for the next year. He will continue using this equipment at the settings noted above for the next 12 months. At that timehe will then return for a routine follow-up evaluation. I have asked the patient to contact the [facility] if he encounters any difficulties prior to that time. The following instructions have been given to the patient today:    Patient Instructions   Continue to use your CPAP nightly. We will follow-up with you in 1 year. Call sooner if you have any concerns. You are compliant for your  physical.    Nursing Support:  When: Monday through Friday 7A-5PM except holidays  Where: Our direct line is [phone]. If you are having a true emergency please call 911. In the event that the line is busy or it is after hours please leave a voice message and we will return your call. Please speak clearly, leaving your full name, birth date, best number to reach you and the reason for your call. Medication refills: We will need the name of the medication, the dosage, the ordering provider, whether you get a 30 or 90 day refill, and the pharmacy name and address. Medications will be ordered by the provider only. Nurses cannot call in prescriptions. Please allow 7 days for medication refills. Physician requested updates:  If your provider requested that you call with an update after starting medication, please be ready to provide us the medication and dosage, what time you take your medication, the time you attempt to fall asleep, time you fall asleep, when you wake up, and what time you get out of bed. Sleep Study Results: We will contact you with sleep study results and/or next steps after the physician has reviewed your testing.            Carolyn Shankar PA-C  [address]

## 2023-08-23 ENCOUNTER — TELEPHONE (OUTPATIENT)
Dept: SLEEP CENTER | Facility: CLINIC | Age: 68
End: 2023-08-23

## 2023-08-24 LAB
DME PARACHUTE DELIVERY DATE ACTUAL: NORMAL
DME PARACHUTE DELIVERY DATE REQUESTED: NORMAL
DME PARACHUTE ITEM DESCRIPTION: NORMAL
DME PARACHUTE ORDER STATUS: NORMAL
DME PARACHUTE SUPPLIER NAME: NORMAL
DME PARACHUTE SUPPLIER PHONE: NORMAL

## 2024-01-25 ENCOUNTER — OFFICE VISIT (OUTPATIENT)
Dept: SLEEP CENTER | Facility: CLINIC | Age: 69
End: 2024-01-25
Payer: MEDICARE

## 2024-01-25 VITALS
SYSTOLIC BLOOD PRESSURE: 128 MMHG | DIASTOLIC BLOOD PRESSURE: 78 MMHG | RESPIRATION RATE: 16 BRPM | HEART RATE: 82 BPM | HEIGHT: 70 IN | BODY MASS INDEX: 28.06 KG/M2 | OXYGEN SATURATION: 98 % | WEIGHT: 196 LBS

## 2024-01-25 DIAGNOSIS — G47.33 OSA ON CPAP: Primary | ICD-10-CM

## 2024-01-25 PROCEDURE — 99213 OFFICE O/P EST LOW 20 MIN: CPT | Performed by: PHYSICIAN ASSISTANT

## 2024-01-25 NOTE — PROGRESS NOTES
Progress Note - [facility]   Aron Reid 68 y.o. male   :1955, MRN: [id number]  2024          Follow Up Evaluation / Problem:     Severe Obstructive Sleep Apnea      Thank you for the opportunity of participating in the evaluation and care of this patient in the Sleep Clinic at [facility].      HPI: Aron Reid is a 68 y.o. year old male.  The patient presents for follow up of severe obstructive sleep apnea.  A home sleep study performed on 2023, which showed severe obstructive sleep apnea with an ELIZABET of 30.4 and oxygen rusty of 75%.  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  He presents today to review compliance and effectiveness of treatment.         Current Outpatient Medications:     atorvastatin (LIPITOR) 20 mg tablet, TAKE 1 TABLET DAILY, Disp: 90 tablet, Rfl: 0    famotidine (PEPCID) 20 mg tablet, , Disp: , Rfl:     fluticasone (FLONASE) 50 mcg/act nasal spray, 2 sprays into each nostril Daily, Disp: , Rfl:     Loratadine 10 MG CAPS, Take by mouth, Disp: , Rfl:     losartan (COZAAR) 25 mg tablet, TAKE 1 TABLET DAILY, Disp: 90 tablet, Rfl: 0    montelukast (SINGULAIR) 10 mg tablet, TAKE 1 TABLET BY MOUTH EVERYDAY AT BEDTIME, Disp: 90 tablet, Rfl: 0    Multiple Vitamins-Minerals (ONE-A-DAY 50 PLUS PO), Take by mouth, Disp: , Rfl:     oxybutynin (DITROPAN-XL) 10 MG 24 hr tablet, Take 1 tablet (10 mg total) by mouth daily at bedtime, Disp: 90 tablet, Rfl: 3    pantoprazole (PROTONIX) 40 mg tablet, , Disp: , Rfl:     simvastatin (ZOCOR) 40 mg tablet, Take 40 mg by mouth daily, Disp: , Rfl:     tadalafil (CIALIS) 10 MG tablet, Take 10 mg by mouth daily as needed, Disp: , Rfl:     tadalafil (CIALIS) 20 MG tablet, Take 1 tablet (20 mg total) by mouth daily as needed for erectile dysfunction, Disp: 90 tablet, Rfl: 0    tamsulosin (FLOMAX) 0.4 mg, TAKE 1 CAPSULE DAILY, Disp:  "90 capsule, Rfl: 0    amLODIPine (NORVASC) 10 mg tablet, Take 1 tablet (10 mg total) by mouth daily (Patient not taking: Reported on 5/8/2023), Disp: 90 tablet, Rfl: 3    LORazepam (ATIVAN) 0.5 mg tablet, take 1 tablet by oral route 1/2 hour prior to CT scan (Patient not taking: Reported on 8/22/2023), Disp: , Rfl:     SUMAtriptan (IMITREX) 100 mg tablet, Take by mouth (Patient not taking: Reported on 1/18/2023), Disp: , Rfl:     How likely are you to doze off or fall asleep in the following situations, in contrast to feeling just tired?  Sitting and reading: Would never doze  Watching TV: Would never doze  Sitting, inactive in a public place (e.g. a theatre or a meeting): Would never doze  As a passenger in a car for an hour without a break: Would never doze  Lying down to rest in the afternoon when circumstances permit: Would never doze  Sitting and talking to someone: Would never doze  Sitting quietly after a lunch without alcohol: Would never doze  In a car, while stopped for a few minutes in traffic: Would never doze  Total score: 0              Vitals:    01/25/24 1303   BP: 128/78   Pulse: 82   Resp: 16   SpO2: 98%   Weight: 88.9 kg (196 lb)   Height: 5' 10\" (1.778 m)       Body mass index is 28.12 kg/m².            EPWORTH SLEEPINESS SCORE  Total score: 0      Past History Since Last Sleep Center Visit:     Patient states he uses his CPAP every night.  He has no concerns or complaints.  He states he was told that Medicare required him a face-to-face follow-up visit.  The patient reports that he cleans the equipment appropriately and changes supplies on a regular basis.    The review of systems and following portions of the patient's history were reviewed and updated as appropriate: allergies, current medications, past family history, past medical history, past social history, past surgical history, and problem list.        OBJECTIVE  Equipment set up date:  5/18/23, ResMed S11  PAP Pressure: Nasal AutoPAP " using a lower limit of 5 cm and an upper limit of 15 cm of water pressure  Type of mask used: full face, hybrid   DME Provider: [facility]  Denies aerophagia or AM headaches    Physical Exam:     General Appearance:   Alert, cooperative, no distress, appears stated age       ASSESSMENT / PLAN    1. KARMEN on CPAP  Assessment & Plan:  Patient has a history of severe obstructive sleep apnea diagnosed in May 2023.  He has been using CPAP consistently since his diagnosis.  He is currently using it 100% of the time greater than 4 hours 100% of the time with a residual AHI of 1.7.  He will continue to use his CPAP nightly.  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  He is fully compliant with his CPAP usage.  We can follow-up with him in 1 year or sooner if he has any concerns.    Orders:  -     PAP DME Resupply/Reorder             Counseling / Coordination of Care  I have spent a total time of 20 minutes on 01/25/24 in caring for this patient including Instructions for management, Patient and family education, Importance of tx compliance, Risk factor reductions, Impressions, Counseling / Coordination of care, Documenting in the medical record, Reviewing / ordering tests, medicine, procedures  , and Obtaining or reviewing history  .      Today I reviewed the patient's compliance data.  he has been able to use the equipment 100% of all days recorded.  Average usage was 4 or more hours 100% of all days recorded.  The patient uses the equipment for an average of 7 hours and 30 minutes per night.  The estimated AHI is 1.7 abnormal breathing events per hour.  The patient feels they benefit from the use of PAP equipment and would like to continue PAP therapy.  Response to treatment has been good.  A prescription for supplies has been provided to last for the next year.    He will continue using this equipment at the settings noted above for the next 12 months.  At that timehe will then return for a routine follow-up evaluation. I  have asked the patient to contact the Sleep Disorders Center if he encounters any difficulties prior to that time.    The following instructions have been given to the patient today:    Patient Instructions   Your compliance report looks excellent.  I would not recommend any changes.  Continue to use your CPAP nightly.  Will plan to follow-up with you in 1 year or sooner if you have any concerns.    Nursing Support:  When: Monday through Friday 7A-5PM except holidays  Where: Our direct line is [phone].    If you are having a true emergency please call 911.  In the event that the line is busy or it is after hours please leave a voice message and we will return your call.  Please speak clearly, leaving your full name, birth date, best number to reach you and the reason for your call.   Medication refills: We will need the name of the medication, the dosage, the ordering provider, whether you get a 30 or 90 day refill, and the pharmacy name and address.  Medications will be ordered by the provider only.  Nurses cannot call in prescriptions.  Please allow 7 days for medication refills.  Physician requested updates: If your provider requested that you call with an update after starting medication, please be ready to provide us the medication and dosage, what time you take your medication, the time you attempt to fall asleep, time you fall asleep, when you wake up, and what time you get out of bed.  Sleep Study Results: We will contact you with sleep study results and/or next steps after the physician has reviewed your testing.         Delores Maier PA-C  [facility]

## 2024-01-25 NOTE — PATIENT INSTRUCTIONS
Your compliance report looks excellent.  I would not recommend any changes.  Continue to use your CPAP nightly.  Will plan to follow-up with you in 1 year or sooner if you have any concerns.    Nursing Support:  When: Monday through Friday 7A-5PM except holidays  Where: Our direct line is [phone].    If you are having a true emergency please call 911.  In the event that the line is busy or it is after hours please leave a voice message and we will return your call.  Please speak clearly, leaving your full name, birth date, best number to reach you and the reason for your call.   Medication refills: We will need the name of the medication, the dosage, the ordering provider, whether you get a 30 or 90 day refill, and the pharmacy name and address.  Medications will be ordered by the provider only.  Nurses cannot call in prescriptions.  Please allow 7 days for medication refills.  Physician requested updates: If your provider requested that you call with an update after starting medication, please be ready to provide us the medication and dosage, what time you take your medication, the time you attempt to fall asleep, time you fall asleep, when you wake up, and what time you get out of bed.  Sleep Study Results: We will contact you with sleep study results and/or next steps after the physician has reviewed your testing.

## 2024-01-25 NOTE — ASSESSMENT & PLAN NOTE
Patient has a history of severe obstructive sleep apnea diagnosed in May 2023.  He has been using CPAP consistently since his diagnosis.  He is currently using it 100% of the time greater than 4 hours 100% of the time with a residual AHI of 1.7.  He will continue to use his CPAP nightly.  He has a CDL and drives a schoolbus.  He is fully compliant with his CPAP usage.  We can follow-up with him in 1 year or sooner if he has any concerns.

## 2024-01-26 ENCOUNTER — TELEPHONE (OUTPATIENT)
Dept: SLEEP CENTER | Facility: CLINIC | Age: 69
End: 2024-01-26

## 2024-01-29 LAB

## 2024-02-21 PROBLEM — Z12.5 SCREENING FOR PROSTATE CANCER: Status: RESOLVED | Noted: 2018-12-10 | Resolved: 2024-02-21

## 2024-02-21 PROBLEM — Z12.11 SCREEN FOR COLON CANCER: Status: RESOLVED | Noted: 2018-12-10 | Resolved: 2024-02-21

## 2024-04-15 ENCOUNTER — TELEPHONE (OUTPATIENT)
Dept: SLEEP CENTER | Facility: CLINIC | Age: 69
End: 2024-04-15

## 2024-04-15 NOTE — TELEPHONE ENCOUNTER
Patient left message stating [employer] needs a copy of his face to face visit with Delores in order for his insurance to continue to pay for his machine.     Copy of office note from 1/25/24 handed to the [employer] liaison Daniella in the [place] office.     Patient notified.
